# Patient Record
Sex: MALE | Race: ASIAN | NOT HISPANIC OR LATINO | URBAN - METROPOLITAN AREA
[De-identification: names, ages, dates, MRNs, and addresses within clinical notes are randomized per-mention and may not be internally consistent; named-entity substitution may affect disease eponyms.]

---

## 2017-11-22 ENCOUNTER — OFFICE VISIT (OUTPATIENT)
Dept: FAMILY MEDICINE | Facility: CLINIC | Age: 47
End: 2017-11-22

## 2017-11-22 VITALS
HEART RATE: 65 BPM | HEIGHT: 65 IN | BODY MASS INDEX: 23.36 KG/M2 | DIASTOLIC BLOOD PRESSURE: 79 MMHG | SYSTOLIC BLOOD PRESSURE: 118 MMHG | TEMPERATURE: 98.2 F | WEIGHT: 140.2 LBS

## 2017-11-22 DIAGNOSIS — G47.9 DIFFICULTY SLEEPING: ICD-10-CM

## 2017-11-22 DIAGNOSIS — R63.0 POOR APPETITE: Primary | ICD-10-CM

## 2017-11-22 DIAGNOSIS — M54.9 MUSCULOSKELETAL BACK PAIN: ICD-10-CM

## 2017-11-22 LAB
% GRANULOCYTES: 70.8 %G (ref 40–75)
ALBUMIN SERPL-MCNC: 4.6 MG/DL (ref 3.9–5.1)
ALP SERPL-CCNC: 51.8 U/L (ref 40–150)
ALT SERPL-CCNC: 26.3 U/L (ref 0–45)
AST SERPL-CCNC: 26 U/L (ref 0–55)
BILIRUB SERPL-MCNC: 0.5 MG/DL (ref 0.2–1.3)
BUN SERPL-MCNC: 15.1 MG/DL (ref 7–21)
CALCIUM SERPL-MCNC: 9.7 MG/DL (ref 8.5–10.1)
CHLORIDE SERPLBLD-SCNC: 106.4 MMOL/L (ref 98–110)
CO2 SERPL-SCNC: 29.4 MMOL/L (ref 20–32)
CREAT SERPL-MCNC: 0.8 MG/DL (ref 0.7–1.3)
GFR SERPL CREATININE-BSD FRML MDRD: >90 ML/MIN/1.7 M2
GLUCOSE SERPL-MCNC: 109.6 MG'DL (ref 70–99)
GRANULOCYTES #: 4 K/UL (ref 1.6–8.3)
HCT VFR BLD AUTO: 43.6 % (ref 40–53)
HEMOGLOBIN: 13.7 G/DL (ref 13.3–17.7)
LYMPHOCYTES # BLD AUTO: 1.2 K/UL (ref 0.8–5.3)
LYMPHOCYTES NFR BLD AUTO: 21.8 %L (ref 20–48)
MCH RBC QN AUTO: 31.4 PG (ref 26.5–35)
MCHC RBC AUTO-ENTMCNC: 31.4 G/DL (ref 32–36)
MCV RBC AUTO: 99.8 FL (ref 78–100)
MID #: 0.4 K/UL (ref 0–2.2)
MID %: 7.4 %M (ref 0–20)
PLATELET # BLD AUTO: 284 K/UL (ref 150–450)
POTASSIUM SERPL-SCNC: 4.2 MMOL/DL (ref 3.2–4.6)
PROT SERPL-MCNC: 7.1 G/DL (ref 6.8–8.8)
RBC # BLD AUTO: 4.4 M/UL (ref 4.4–5.9)
SODIUM SERPL-SCNC: 142.6 MMOL/L (ref 132–142)
TSH SERPL DL<=0.05 MIU/L-ACNC: 1.39 UIU/ML (ref 0.3–5)
WBC # BLD AUTO: 5.6 K/UL (ref 4–11)

## 2017-11-22 RX ORDER — NAPROXEN 500 MG/1
500 TABLET ORAL 2 TIMES DAILY PRN
Qty: 60 TABLET | Refills: 0 | Status: SHIPPED | OUTPATIENT
Start: 2017-11-22 | End: 2019-05-09

## 2017-11-22 ASSESSMENT — PATIENT HEALTH QUESTIONNAIRE - PHQ9: SUM OF ALL RESPONSES TO PHQ QUESTIONS 1-9: 3

## 2017-11-22 NOTE — PROGRESS NOTES
Preceptor attestation:  Patient seen and discussed with the resident. Assessment and plan reviewed with resident and agreed upon.  Supervising physician: Mary Resendiz  [facility]

## 2017-11-22 NOTE — NURSING NOTE
name: Milo May  Language: Montenegrin  Agency: [facility]  Phone number: [phone]

## 2017-11-22 NOTE — NURSING NOTE
"Injectable Influenza Immunization Documentation    1.  Has the patient received the information for the injectable influenza vaccine? YES     2. Is the patient 6 months of age or older? YES     3. Does the patient have any of the following contraindications?         Severe allergy to eggs? No     Severe allergic reaction to previous influenza vaccines? No   Severe allergy to latex? No       History of Guillain-Zeeland syndrome? No     Currently have a temperature greater than 100.4F? No        4.  Severely egg allergic patients should have flu vaccine eligibility assessed by an MD, RN, or pharmacist, and those who received flu vaccine should be observed for 15 min by an MD, RN, Pharmacist, Medical Technician, or member of clinic staff.\": YES    5. Latex-allergic patients should be given latex-free influenza vaccine Yes. Please reference the Vaccine latex table to determine if your clinic s product is latex-containing.       Vaccination given by Briana Laughlin CMA        "
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
## 2017-11-22 NOTE — PATIENT INSTRUCTIONS
Toe,     Please take Ibuprofen as needed for back pain. Return in one week to discuss lab results. Attached as some tips for good sleep hygiene. Will address more thorough at follow up.     Thank you,   Dr. Vera Emery      Caring for Your Back Throughout the Day  Take care of your back throughout the day. You will likely have fewer back problems if you do. Try to warm up before you move. Shift positions often. Also do your best to form healthy habits.    Warm up for the day  Do a few slow, catlike stretches before starting your day. This simple warmup can soften your disks, stretch your back muscles, and help prevent injuries.  Shift positions often  At work and at home, change positions often. This helps keep your body from getting stiff. Stand up or lean back while you sit. If you can, get up and move every 1/2 hour.  Form healthy habits  Here are some suggestions:     Keep a healthy weight. When you weigh too much, your back is under excess strain. But losing just a few extra pounds can help a lot.    Try not to overeat. Learn about serving sizes. The size of a serving depends on the food and the food group. Many foods list serving sizes on the labels.    Handle minor aches with cold and heat. Apply cold the first 24 to 48 hours. Use heat after that. Always place a thin cloth between your skin and the source of cold or heat.    Take medicines as directed. This helps keep pain under control. Always read labels, and call your healthcare provider or pharmacist if you have any questions.  Walk each day  A daily walk keeps your back and thigh muscles stretched and strong. This gives your back better support. Be sure to walk with your spine s three curves aligned, by keeping your head, hips, and toes connected by a vertical line.   Date Last Reviewed: 10/18/2015    3499-6900 [employer]. [address] [place] [address], [place] [address]. All rights reserved. This information is not intended as a substitute for  "professional medical care. Always follow your healthcare professional's instructions.      Tips for Sleep Hygiene  \"Sleep hygiene\" means having good sleep habits.Follow these tips to sleep better at night:     Get on a schedule. Go to bed and get up at about the same time every day.    Listen to your body. Only try to sleep when you actually feel tired or sleepy.    Be patient. If you haven't been able to get to sleep after about 30 minutes or more, get up and do something calming or boring until you feel sleepy. Then return to bed and try again.    Don't have caffeine (coffee, tea, cola drinks, chocolate and some medicines), alcohol or nicotine (cigarettes). These can make it harder for you to fall asleep and stay asleep.    Use your bed for sleeping only. That means no TV, computer or homework in bed, especially during the evening and before bedtime.    Don't nap during the day. If you must nap, make sure it is for less than 20 minutes.    Create sleep rituals that remind your body it is time to sleep. Examples include breathing exercises, stretching or reading a book.    Avoid all electronic media (smart phone, computer, tablet) within 2 hours of bed time. The \"blue light\" in these devices activates the part of the brain that keeps you awake.    Dim the lights at night.    Get early morning sources of light (walk in the sunshine) to help set sleep patterns at night.    Try a bath or shower before bed. Having a warm bath 1 to 2 hours before bedtime can help you feel sleepy. Hot baths can make you alert, so be mindful of the temperature.    Don't watch the clock. Checking the clock during the night can wake you up. It can also lead to negative thoughts such as, \"I will never fall asleep,\" which can increase anxiety and sleeplessness.    Use a sleep diary. Track your sleep schedule to know your sleep patterns and to see where you can improve.    Get regular exercise every day. Try not to do heavy exercise in the 4 " hours before bedtime.    Eat a healthy, balanced diet.    Try eating a light, healthy snack before bed, but avoid eating a heavy meal.    Create the right sleeping area. A cool, dark, quiet room is best. If needed, try earplugs, fans and blackout curtains.    Keep your daytime routine the same even if you have a bad night sleep. Avoiding activities the next day can make it harder to sleep.  For informational purposes only. Not to replace the advice of your health care provider.   Copyright   2013 [employer]. All rights reserved. [employer] 887062   01/16.

## 2017-11-22 NOTE — MR AVS SNAPSHOT
After Visit Summary   11/22/2017    Allen Donnelly    MRN: [id number]           Patient Information     Date Of Birth          1970        Visit Information        Provider Department      11/22/2017 8:40 AM [facility]        Today's Diagnoses     Poor appetite    -  1    Difficulty sleeping        Musculoskeletal back pain          Care Instructions    Toe,     Please take Ibuprofen as needed for back pain. Return in one week to discuss lab results. Attached as some tips for good sleep hygiene. Will address more thorough at follow up.     Thank you,   Dr. Vera Emery      Caring for Your Back Throughout the Day  Take care of your back throughout the day. You will likely have fewer back problems if you do. Try to warm up before you move. Shift positions often. Also do your best to form healthy habits.    Warm up for the day  Do a few slow, catlike stretches before starting your day. This simple warmup can soften your disks, stretch your back muscles, and help prevent injuries.  Shift positions often  At work and at home, change positions often. This helps keep your body from getting stiff. Stand up or lean back while you sit. If you can, get up and move every 1/2 hour.  Form healthy habits  Here are some suggestions:     Keep a healthy weight. When you weigh too much, your back is under excess strain. But losing just a few extra pounds can help a lot.    Try not to overeat. Learn about serving sizes. The size of a serving depends on the food and the food group. Many foods list serving sizes on the labels.    Handle minor aches with cold and heat. Apply cold the first 24 to 48 hours. Use heat after that. Always place a thin cloth between your skin and the source of cold or heat.    Take medicines as directed. This helps keep pain under control. Always read labels, and call your healthcare provider or pharmacist if you have any questions.  Walk each day  A daily walk keeps your  "back and thigh muscles stretched and strong. This gives your back better support. Be sure to walk with your spine s three curves aligned, by keeping your head, hips, and toes connected by a vertical line.   Date Last Reviewed: 10/18/2015    4595-6916 [employer]. [address] [place] [address], [place] [address]. All rights reserved. This information is not intended as a substitute for professional medical care. Always follow your healthcare professional's instructions.      Tips for Sleep Hygiene  \"Sleep hygiene\" means having good sleep habits.Follow these tips to sleep better at night:     Get on a schedule. Go to bed and get up at about the same time every day.    Listen to your body. Only try to sleep when you actually feel tired or sleepy.    Be patient. If you haven't been able to get to sleep after about 30 minutes or more, get up and do something calming or boring until you feel sleepy. Then return to bed and try again.    Don't have caffeine (coffee, tea, cola drinks, chocolate and some medicines), alcohol or nicotine (cigarettes). These can make it harder for you to fall asleep and stay asleep.    Use your bed for sleeping only. That means no TV, computer or homework in bed, especially during the evening and before bedtime.    Don't nap during the day. If you must nap, make sure it is for less than 20 minutes.    Create sleep rituals that remind your body it is time to sleep. Examples include breathing exercises, stretching or reading a book.    Avoid all electronic media (smart phone, computer, tablet) within 2 hours of bed time. The \"blue light\" in these devices activates the part of the brain that keeps you awake.    Dim the lights at night.    Get early morning sources of light (walk in the sunshine) to help set sleep patterns at night.    Try a bath or shower before bed. Having a warm bath 1 to 2 hours before bedtime can help you feel sleepy. Hot baths can make you alert, so be mindful of the " "temperature.    Don't watch the clock. Checking the clock during the night can wake you up. It can also lead to negative thoughts such as, \"I will never fall asleep,\" which can increase anxiety and sleeplessness.    Use a sleep diary. Track your sleep schedule to know your sleep patterns and to see where you can improve.    Get regular exercise every day. Try not to do heavy exercise in the 4 hours before bedtime.    Eat a healthy, balanced diet.    Try eating a light, healthy snack before bed, but avoid eating a heavy meal.    Create the right sleeping area. A cool, dark, quiet room is best. If needed, try earplugs, fans and blackout curtains.    Keep your daytime routine the same even if you have a bad night sleep. Avoiding activities the next day can make it harder to sleep.  For informational purposes only. Not to replace the advice of your health care provider.   Copyright   2013 [employer]. All rights reserved. [employer] [id number] - 01/16.            Follow-ups after your visit        Who to contact     Please call your clinic at [phone] to:    Ask questions about your health    Make or cancel appointments    Discuss your medicines    Learn about your test results    Speak to your doctor   If you have compliments or concerns about an experience at your clinic, or if you wish to file a complaint, please contact [facility] Patient Relations at [phone] or email us at [email or web address]         Additional Information About Your Visit        2[employer] Information     U-NOTE is an electronic gateway that provides easy, online access to your medical records. With U-NOTE, you can request a clinic appointment, read your test results, renew a prescription or communicate with your care team.     To sign up for [employer] visit the website at [email or web address]   You will be asked to enter the access code listed below, as well as some personal " "information. Please follow the directions to create your username and password.     Your access code is: [email or web address]  Expires: 2018  9:25 AM     Your access code will  in 90 days. If you need help or a new code, please contact your [facility] or call [phone] for assistance.        Care EveryWhere ID     This is your Care EveryWhere ID. This could be used by other organizations to access your [place] medical records  [id number]        Your Vitals Were     Pulse Temperature Height BMI (Body Mass Index)          65 98.2  F (36.8  C) (Oral) 5' 4.5\" (163.8 cm) 23.69 kg/m2         Blood Pressure from Last 3 Encounters:   17 118/79   16 121/80   09/21/15 109/79    Weight from Last 3 Encounters:   17 140 lb 3.2 oz (63.6 kg)   16 139 lb 8 oz (63.3 kg)   09/21/15 125 lb (56.7 kg)              We Performed the Following     CBC with Diff Plt ([facility])     Comprehensive Metabolic Panel ([place])     TSH  Sensitive ([facility])     Vitamin D 25-Hydroxy ([facility])          Today's Medication Changes          These changes are accurate as of: 17  9:25 AM.  If you have any questions, ask your nurse or doctor.               Start taking these medicines.        Dose/Directions    naproxen 500 MG tablet   Commonly known as:  NAPROSYN   Used for:  Musculoskeletal back pain   Started by:  Vera Emery MD        Dose:  500 mg   Take 1 tablet (500 mg) by mouth 2 times daily as needed for moderate pain   Quantity:  60 tablet   Refills:  0            Where to get your medicines      These medications were sent to [facility] - [place], [place] - [address], [place] [address]     Phone:  [phone]     naproxen 500 MG tablet                Primary Care Provider Office Phone # Fax #    Vera Emery [phone]       [facility]  [address] [place] [address]        Equal Access to Services "     VLADIMIR LING : Hadii aad ku ashlie Hernandez, waaxda luqadaha, qaybta kaalmada vernamayda, yoni chaka haypal chubolivaremeka espinal . So [facility] [phone].    ATENCIÓN: Si habla español, tiene a oates disposición servicios gratuitos de asistencia lingüística. Llame al [phone].    We comply with applicable federal civil rights laws and [place] laws. We do not discriminate on the basis of race, color, national origin, age, disability, sex, sexual orientation, or gender identity.            Thank you!     Thank you for choosing [facility]  for your care. Our goal is always to provide you with excellent care. Hearing back from our patients is one way we can continue to improve our services. Please take a few minutes to complete the written survey that you may receive in the mail after your visit with us. Thank you!             Your Updated Medication List - Protect others around you: Learn how to safely use, store and throw away your medicines at www.disposemymeds.org.          This list is accurate as of: 11/22/17  9:25 AM.  Always use your most recent med list.                   Brand Name Dispense Instructions for use Diagnosis    naproxen 500 MG tablet    NAPROSYN    60 tablet    Take 1 tablet (500 mg) by mouth 2 times daily as needed for moderate pain    Musculoskeletal back pain       TYLENOL PO

## 2017-11-22 NOTE — PROGRESS NOTES
"[facility]         SUNNY Donnelly is a 47 year old male with a PMH of *** presenting to clinic today with a chief complaint of poor appetite, trouble sleeping, and body aches. Patient has had a poor appetite and trouble sleeping over the last month. He has been experiencing body aches over the last year. He describes his body aches as pains in his sides bilaterally. He sometime has a headache associated with throbbing behind his head.****     PMH, Medications and Allergies were reviewed and updated as needed.    ROS:  General: No fevers, chills  Head: Positive for headache  Ears: No acute change in hearing.    CV: No chest pain or palpitations.  Resp: No shortness of breath.  No cough. No hemoptysis.  GI: No nausea, vomiting, constipation, diarrhea  : No urinary pains    Patient Active Problem List   Diagnosis     Scalp mass       Current Outpatient Prescriptions   Medication Sig Dispense Refill     Acetaminophen (TYLENOL PO)               OBJECTIVE:       Vitals:   Vitals:    11/22/17 0848   BP: 118/79   BP Location: Left arm   Patient Position: Sitting   Cuff Size: Adult Regular   Pulse: 65   Temp: 98.2  F (36.8  C)   TempSrc: Oral   Weight: 140 lb 3.2 oz (63.6 kg)   Height: 5' 4.5\" (163.8 cm)     BMI: Body mass index is 23.69 kg/(m^2).    Gen:  Well nourished and in no acute distress  HEENT: PERRL; TMs normal color and landmarks; nasopharynx pink and moist; oropharynx pink and moist  Neck: supple without lymphadenopathy  CV:  RRR  - no murmurs noted   Pulm:  CTAB, no wheezes or crackles noted, good air entry   ABD: soft, nontender, no masses, no rebound, BS intact throughout, no hepatosplenomegaly  Extrem: no cyanosis, edema or clubbing  Psych: Euthymic           ASSESSMENT and PLAN:     There are no diagnoses linked to this encounter.    Return to clinic in *** for follow up of *** or sooner if develops new or worsening symptoms.    Discussed with Dr." {BTPRECEPTORS:[id number]}    Vera Monroy, DO PGY 1  [facility]  [phone]

## 2017-11-24 LAB — 25(OH)D3 SERPL-MCNC: 23.7 NG/ML (ref 30–80)

## 2017-11-27 ENCOUNTER — OFFICE VISIT (OUTPATIENT)
Dept: FAMILY MEDICINE | Facility: CLINIC | Age: 47
End: 2017-11-27

## 2017-11-27 VITALS
BODY MASS INDEX: 23.53 KG/M2 | HEIGHT: 65 IN | WEIGHT: 141.2 LBS | DIASTOLIC BLOOD PRESSURE: 82 MMHG | OXYGEN SATURATION: 99 % | TEMPERATURE: 98.1 F | HEART RATE: 68 BPM | SYSTOLIC BLOOD PRESSURE: 124 MMHG

## 2017-11-27 DIAGNOSIS — M54.9 MUSCULOSKELETAL BACK PAIN: Primary | ICD-10-CM

## 2017-11-27 DIAGNOSIS — R79.89 LOW VITAMIN D LEVEL: ICD-10-CM

## 2017-11-27 DIAGNOSIS — Z23 INFLUENZA VACCINE NEEDED: ICD-10-CM

## 2017-11-27 NOTE — MR AVS SNAPSHOT
After Visit Summary   2017    Allen Donnelly    MRN: [id number]           Patient Information     Date Of Birth          1970        Visit Information        Provider Department      2017 8:40 AM Vera Emery MD [facility]        Care Instructions    Toe,     Please take vitamin D daily. Continue to use naproxen at night as needed. Return for any recurring symptoms.     Thank you,    Vera Emery          Follow-ups after your visit        Who to contact     Please call your clinic at [phone] to:    Ask questions about your health    Make or cancel appointments    Discuss your medicines    Learn about your test results    Speak to your doctor   If you have compliments or concerns about an experience at your clinic, or if you wish to file a complaint, please contact [facility] Patient Relations at [phone] or email us at [email or web address]         Additional Information About Your Visit        MyChart Information     App[employer]t is an electronic gateway that provides easy, online access to your medical records. With [employer], you can request a clinic appointment, read your test results, renew a prescription or communicate with your care team.     To sign up for [employer] visit the website at [email or web address]   You will be asked to enter the access code listed below, as well as some personal information. Please follow the directions to create your username and password.     Your access code is: [email or web address]  Expires: 2018  9:25 AM     Your access code will  in 90 days. If you need help or a new code, please contact your [facility] or call [phone] for assistance.        Care EveryWhere ID     This is your Care EveryWhere ID. This could be used by other organizations to access your [place] medical records  [id number]        Your Vitals Were     Pulse Temperature Height Pulse Oximetry  "BMI (Body Mass Index)       68 98.1  F (36.7  C) (Oral) 5' 5\" (165.1 cm) 99% 23.5 kg/m2        Blood Pressure from Last 3 Encounters:   11/27/17 124/82   11/22/17 118/79   11/11/16 121/80    Weight from Last 3 Encounters:   11/27/17 141 lb 3.2 oz (64 kg)   11/22/17 140 lb 3.2 oz (63.6 kg)   11/11/16 139 lb 8 oz (63.3 kg)              Today, you had the following     No orders found for display       Primary Care Provider Office Phone # Fax #    Vera Emery [phone]       [facility]  [address] [place] [address]        Equal Access to Services     VLADIMIR LING : Lisa dailey Sodioni, waaxda luqadaha, qaybta kaalmada adeegyada, yoni espinal . So [facility] [phone].    ATENCIÓN: Si habla español, tiene a oates disposición servicios gratuitos de asistencia lingüística. Llame al [phone].    We comply with applicable federal civil rights laws and [place] laws. We do not discriminate on the basis of race, color, national origin, age, disability, sex, sexual orientation, or gender identity.            Thank you!     Thank you for choosing [facility]  for your care. Our goal is always to provide you with excellent care. Hearing back from our patients is one way we can continue to improve our services. Please take a few minutes to complete the written survey that you may receive in the mail after your visit with us. Thank you!             Your Updated Medication List - Protect others around you: Learn how to safely use, store and throw away your medicines at www.disposemymeds.org.          This list is accurate as of: 11/27/17  8:55 AM.  Always use your most recent med list.                   Brand Name Dispense Instructions for use Diagnosis    cholecalciferol 1000 UNIT tablet    vitamin D3    100 tablet    Take 1 tablet (1,000 Units) by mouth daily    Poor appetite, Difficulty sleeping, Musculoskeletal back pain       naproxen 500 MG tablet "    NAPROSYN    60 tablet    Take 1 tablet (500 mg) by mouth 2 times daily as needed for moderate pain    Musculoskeletal back pain       TYLENOL PO

## 2017-11-27 NOTE — PROGRESS NOTES
Preceptor attestation:  Patient seen and discussed with the resident. Assessment and plan reviewed with resident and agreed upon.  Supervising physician: Fili Daniel  [facility]

## 2017-11-27 NOTE — PROGRESS NOTES
"[facility]         SUNNY Donnelly is a 47 year old male refugee from [facility] presenting to clinic today for lab follow up. Patient was seen and evaluated in clinic one week ago on 11/22/17 for poor appetite, difficulty sleeping and body aches. Labs were drawn at that time including vitamin D, CMP, TSH, and CBC. Patient was given naproxen for musculoskeletal back pain and given instructions to return in one week for lab follow up. During evaluation today the patient notes he is feeling significantly improved. He has been taking one naproxen at night which is helping his back pain and allowing him to sleep. He denies any new symptoms and his appetite has improved. We discussed his labs in detail. Labs showed him to have low vitamin D levels however the remainder of his labs were essentially within normal limits.     PMH, Medications and Allergies were reviewed and updated as needed.    ROS:  General: No fevers, chills  Head: No headache  CV: No chest pain or palpitations.  Resp: No shortness of breath.  No cough. No hemoptysis.  : No urinary pains    Patient Active Problem List   Diagnosis     Scalp mass     Poor appetite     Difficulty sleeping     Musculoskeletal back pain     Low vitamin D level       Current Outpatient Prescriptions   Medication Sig Dispense Refill     naproxen (NAPROSYN) 500 MG tablet Take 1 tablet (500 mg) by mouth 2 times daily as needed for moderate pain 60 tablet 0     cholecalciferol (VITAMIN D3) 1000 UNIT tablet Take 1 tablet (1,000 Units) by mouth daily (Patient not taking: Reported on 11/27/2017) 100 tablet 3     Acetaminophen (TYLENOL PO)               OBJECTIVE:       Vitals:   Vitals:    11/27/17 0844   BP: 124/82   Pulse: 68   Temp: 98.1  F (36.7  C)   TempSrc: Oral   SpO2: 99%   Weight: 141 lb 3.2 oz (64 kg)   Height: 5' 5\" (165.1 cm)     BMI: Body mass index is 23.5 kg/(m^2).    Gen:  Well nourished and in no acute distress  HEENT: PERRL; TMs normal " color and landmarks; nasopharynx pink and moist; oropharynx pink and moist  Neck: supple without lymphadenopathy  CV:  RRR  - no murmurs noted   MSK:  Mild right sided paraspinal muscle tenderness to palpation. Improved from previous visit. No point tenderness over spinous processes.    Pulm:  CTAB, no wheezes or crackles noted, good air entry   ABD: soft, nontender, no masses, no rebound, BS intact throughout, no hepatosplenomegaly  Extrem: no cyanosis, edema or clubbing  Psych: Euthymic           ASSESSMENT and PLAN:   1. Musculoskeletal back pain  - Continue to take naproxen PRN for back pain  - Continue home stretching exercises for back    2. Low vitamin D level  - Cholecalciferol (Vitamin D3) 1000 unit tablet- Take 1 tablet daily by mouth with breakfast    Return to clinic for routine follow up or if he develops new or worsening symptoms.     Discussed with MD Vera Finnegan, DO PGY 1  [facility]  [phone]

## 2017-11-27 NOTE — NURSING NOTE
name: Milo May  Language: Guinean  Agency: [facility]  Phone number: [phone]

## 2017-11-27 NOTE — PROGRESS NOTES
"[facility]         SUNNY       Toe Andrzej is a 47 year old male with a PMH of *** presenting to clinic today with a chief complaint of ***.    PMH, Medications and Allergies were reviewed and updated as needed.    ROS:  General: No fevers, chills  Head: No headache  Ears: No acute change in hearing.    CV: No chest pain or palpitations.  Resp: No shortness of breath.  No cough. No hemoptysis.  GI: No nausea, vomiting, constipation, diarrhea  : No urinary pains    Patient Active Problem List   Diagnosis     Scalp mass     Poor appetite     Difficulty sleeping     Musculoskeletal back pain       Current Outpatient Prescriptions   Medication Sig Dispense Refill     naproxen (NAPROSYN) 500 MG tablet Take 1 tablet (500 mg) by mouth 2 times daily as needed for moderate pain 60 tablet 0     cholecalciferol (VITAMIN D3) 1000 UNIT tablet Take 1 tablet (1,000 Units) by mouth daily (Patient not taking: Reported on 11/27/2017) 100 tablet 3     Acetaminophen (TYLENOL PO)               OBJECTIVE:       Vitals:   Vitals:    11/27/17 0844   BP: 124/82   Pulse: 68   Temp: 98.1  F (36.7  C)   TempSrc: Oral   SpO2: 99%   Weight: 141 lb 3.2 oz (64 kg)   Height: 5' 5\" (165.1 cm)     BMI: Body mass index is 23.5 kg/(m^2).    Gen:  Well nourished and in no acute distress  HEENT: PERRL; TMs normal color and landmarks; nasopharynx pink and moist; oropharynx pink and moist  Neck: supple without lymphadenopathy  CV:  RRR  - no murmurs noted   Pulm:  CTAB, no wheezes or crackles noted, good air entry   ABD: soft, nontender, no masses, no rebound, BS intact throughout, no hepatosplenomegaly  Extrem: no cyanosis, edema or clubbing  Psych: Euthymic           ASSESSMENT and PLAN:     There are no diagnoses linked to this encounter.    Return to clinic in *** for follow up of *** or sooner if develops new or worsening symptoms.    Discussed with  {BTPRECEPTORS:[id number]}    Vera Monroy,  PGY 1  [facility] " [facility]  [phone]

## 2017-11-27 NOTE — PATIENT INSTRUCTIONS
Toe,     Please take vitamin D daily. Continue to use naproxen at night as needed. Return for any recurring symptoms.     Thank you,    Vera Emery

## 2019-05-09 ENCOUNTER — OFFICE VISIT (OUTPATIENT)
Dept: FAMILY MEDICINE | Facility: CLINIC | Age: 49
End: 2019-05-09
Payer: COMMERCIAL

## 2019-05-09 VITALS
OXYGEN SATURATION: 100 % | TEMPERATURE: 98.1 F | HEART RATE: 77 BPM | DIASTOLIC BLOOD PRESSURE: 77 MMHG | WEIGHT: 143.8 LBS | RESPIRATION RATE: 16 BRPM | HEIGHT: 65 IN | SYSTOLIC BLOOD PRESSURE: 125 MMHG | BODY MASS INDEX: 23.96 KG/M2

## 2019-05-09 DIAGNOSIS — Z00.00 ROUTINE GENERAL MEDICAL EXAMINATION AT A HEALTH CARE FACILITY: ICD-10-CM

## 2019-05-09 DIAGNOSIS — Z00.00 PREVENTATIVE HEALTH CARE: Primary | ICD-10-CM

## 2019-05-09 DIAGNOSIS — N52.9 ERECTILE DYSFUNCTION, UNSPECIFIED ERECTILE DYSFUNCTION TYPE: ICD-10-CM

## 2019-05-09 LAB
CHOLEST SERPL-MCNC: 162 MG/DL
FASTING?: ABNORMAL
HDLC SERPL-MCNC: 42 MG/DL
HIV 1+2 AB+HIV1 P24 AG SERPL QL IA: NEGATIVE
LDLC SERPL CALC-MCNC: 71 MG/DL
TRIGL SERPL-MCNC: 246 MG/DL

## 2019-05-09 SDOH — HEALTH STABILITY: MENTAL HEALTH: HOW MANY STANDARD DRINKS CONTAINING ALCOHOL DO YOU HAVE ON A TYPICAL DAY?: 1 OR 2

## 2019-05-09 SDOH — HEALTH STABILITY: MENTAL HEALTH: HOW OFTEN DO YOU HAVE A DRINK CONTAINING ALCOHOL?: 4 OR MORE TIMES A WEEK

## 2019-05-09 SDOH — HEALTH STABILITY: MENTAL HEALTH: HOW OFTEN DO YOU HAVE 6 OR MORE DRINKS ON ONE OCCASION?: NEVER

## 2019-05-09 ASSESSMENT — MIFFLIN-ST. JEOR: SCORE: 1445.4

## 2019-05-09 NOTE — PROGRESS NOTES
Preceptor Attestation:   Patient seen, evaluated and discussed with the resident. I have verified the content of the note, which accurately reflects my assessment of the patient and the plan of care.   Supervising Physician:  Blas Lyles MD

## 2019-05-09 NOTE — PROGRESS NOTES
"  Male Physical Note      Concerns today: Patient has injected an unknown substance into the skin of his penis with a needle. Possibly olive oil, but patient is unclear.  A \"friend\" had done this about 1 year ago. Patient denies any dysuria. He is still able to have an erection and ejaculate. His wife notes that his erection is not as strong as it had been in the past.     A [employer]  was used for  this visit.     ROS:                      CONSTITUTIONAL: no fatigue, no unexpected change in weight  SKIN: no worrisome rashes, no worrisome moles, no worrisome lesions  EYES: no acute vision problems or changes  ENT: no ear problems, no mouth problems, no throat problems  RESP: no significant cough, no shortness of breath  CV: no chest pain, no palpitations, no new or worsening peripheral edema  GI: no nausea, no vomiting, no constipation, no diarrhea  : no frequency, no dysuria, no hematuria  MS: no claudication, no myalgias, no joint aches  NEURO: no weakness, no dizziness, no syncope, no headaches  PSYCHIATRIC: NEGATIVE for changes in mood or affect    History reviewed. No pertinent past medical history.     Family History   Problem Relation Age of Onset     Diabetes No family hx of      Coronary Artery Disease No family hx of      Breast Cancer No family hx of      Colon Cancer No family hx of      Prostate Cancer No family hx of      Other Cancer No family hx of      Reviewed no other significant FH           Family History and past Medical History reviewed and unchanged/updated.    Social History     Tobacco Use     Smoking status: Former Smoker     Types: Dip, chew, snus or snuff     Smokeless tobacco: Never Used     Tobacco comment: chews tobacco    Substance Use Topics     Alcohol use: Yes     Alcohol/week: 0.0 oz       Children ? yes 3 children, aged 26, 22, and 11.     Has anyone hurt you physically, for example by pushing, hitting, slapping or kicking you or forcing you to have sex? " "Denies  Do you feel threatened or controlled by a partner, ex-partner or anyone in your life? Denies    RISK BEHAVIORS AND HEALTHY HABITS:  Tobacco Use/Smoking: Details: Chewing tobacco  Illicit Drug Use: None  ETOH: Details drinks 1 beer a day  Do you use alcohol? Yes  Number of drinks per day 1  Number of drinking days a week 7  Sexually Active: Yes  Diet (5-7 servings of fruits/veg daily): Yes   Exercise (30 min accumulated most days):Yes  Dental Care: No   Calcium 1500 mg/d:  No  Seat Belt Use: Yes     Cholesterol Level (>44 yo or at risk):  Recommended and patient accepted testing. and HIV screening:  Recommended and patient accepted testing.  Prostate screening discussed and patient informed that risks of screening may exceed benefits.        Immunization History   Administered Date(s) Administered     HepB 11/27/2008     Influenza (IIV3) PF 10/10/2012, 11/22/2017     Influenza Vaccine, 3 YRS +, IM (QUADRIVALENT W/PRESERVATIVES) 11/10/2015, 11/11/2016     MMR 11/27/2008, 01/21/2010     TD (ADULT, 7+) 11/27/2008, 01/21/2010     Tdap (Adacel,Boostrix) 05/11/2009     Reviewed Immunization Record Today    EXAMINATION:  /77   Pulse 77   Temp 98.1  F (36.7  C) (Oral)   Resp 16   Ht 1.645 m (5' 4.76\")   Wt 65.2 kg (143 lb 12.8 oz)   SpO2 100%   BMI 24.10 kg/m    GENERAL: healthy, alert and no distress  EYES: Eyes grossly normal to inspection, extraocular movements - intact, and PERRL  HENT: ear canals- normal; TMs- normal; Nose- normal; Mouth- no ulcers, no lesions  NECK: no tenderness, no adenopathy, no asymmetry, no masses, no stiffness; thyroid- normal to palpation  RESP: lungs clear to auscultation - no rales, no rhonchi, no wheezes  CV: regular rates and rhythm, normal S1 S2, no S3 or S4 and no murmur, no click or rub -  ABDOMEN: soft, no tenderness, no  hepatosplenomegaly, no masses, normal bowel sounds  MS: extremities- no gross deformities noted, no edema  SKIN: no suspicious lesions, no " rashes  NEURO: strength and tone- normal, sensory exam- grossly normal, mentation- intact, speech- normal, reflexes- symmetric  BACK: no CVA tenderness, no paralumbar tenderness  - male: circumferential thickening of the penile skin 1.5 cm distal to the base of the the penis. testicles- normal, no atrophy, no masses;  no inguinal hernias  RECTAL- male: no masses, no hemorhoids, Prostate- symmetric, no  nodularity, no masses, no hypertrophy  PSYCH: Alert and oriented times 3; speech- coherent , normal rate and volume; able to articulate logical thoughts, able to abstract reason, no tangential thoughts, no hallucinations or delusions, affect- normal  LYMPHATICS: ant. cervical- normal, post. cervical- normal, axillary- normal, supraclavicular- normal, inguinal- normal    ASSESSMENT:  1. Health Care Maintenance: Normal Physical Exam  2. Erectile dysfunction  3. Thickened penile skin s/p injection w/ foreign substance   -This did happen one year ago, however unclear what was injected into the penis. Skin thickening does appear abnormal. Will have urology evaluate this.     PLAN:  1.Lipid panel ordered.   2. Check HIV, HEP C, Gonorrhea/Chlamydia  3. Referral to Urology    Cody Rodrigez MD PGY3  [facility]

## 2019-05-09 NOTE — PATIENT INSTRUCTIONS
Preventive Health Recommendations  Male Ages 40 to 49    Yearly exam:             See your health care provider every year in order to  o   Review health changes.   o   Discuss preventive care.    o   Review your medicines if your doctor has prescribed any.    You should be tested each year for STDs (sexually transmitted diseases) if you re at risk.     Have a cholesterol test every 5 years.     Have a colonoscopy (test for colon cancer) if someone in your family has had colon cancer or polyps before age 50.     After age 45, have a diabetes test (fasting glucose). If you are at risk for diabetes, you should have this test every 3 years.      Talk with your health care provider about whether or not a prostate cancer screening test (PSA) is right for you.    Shots: Get a flu shot each year. Get a tetanus shot every 10 years.     Nutrition:    Eat at least 5 servings of fruits and vegetables daily.     Eat whole-grain bread, whole-wheat pasta and brown rice instead of white grains and rice.     Get adequate Calcium and Vitamin D.     Lifestyle    Exercise for at least 150 minutes a week (30 minutes a day, 5 days a week). This will help you control your weight and prevent disease.     Limit alcohol to one drink per day.     No smoking.     Wear sunscreen to prevent skin cancer.     See your dentist every six months for an exam and cleaning.     [facility] [address] [place] [address]  Phone: [phone]  Fax: [phone]    Appointment:  Wednesday May 29, 2019  Arrival Time:  12:45 pm  Provider:  Dr. Cabrera      ** has requested a **  for your appointment    Please bring a copy of your insurance card, photo ID, your medications including over the counter medications or a list of them with you to your appointment    If you cannot make this appointment please call [phone] to reschedule    **HOLD FOR COMPLETED OV NOTE Referral, demographics, office visit, labs  and medication list faxed to [phone]. Sue BELTRAN

## 2019-05-09 NOTE — NURSING NOTE
Due to patient being non-English speaking/uses sign language, an  was used for this visit. Only for face-to-face interpretation by an external agency, date and length of interpretation can be found on the scanned worksheet.       name: Milo May  Language: Lao  Agency:  [employer]  Telephone number: [phone]  Type of interpretation:  Face-to-face, spoken

## 2019-05-10 LAB
C TRACH RRNA SPEC QL NAA+PROBE: NEGATIVE
HCV AB SER QL: NEGATIVE
N GONORRHOEA RRNA SPEC QL NAA+PROBE: NEGATIVE
TREPONEMA ANTIBODY (SYPHILIS): NEGATIVE

## 2019-05-17 NOTE — RESULT ENCOUNTER NOTE
Please have  call patient with the following:    Gio Donnelly,    The results of your testing have come back. You are negative for any sexually transmitted diseases including chlamydia, gonorrhea, hepatitis C, HIV, and syphilis. Your cholesterol level is also normal at this time. You should still follow-up with urology as we had previously discussed. If you have any questions, feel free to call back or schedule a follow-up appointment.    Thank you,    Cody Rodrigez MD PGY3  [facility]

## 2021-10-08 ENCOUNTER — OFFICE VISIT (OUTPATIENT)
Dept: FAMILY MEDICINE | Facility: CLINIC | Age: 51
End: 2021-10-08
Payer: COMMERCIAL

## 2021-10-08 VITALS
WEIGHT: 142.2 LBS | TEMPERATURE: 98.4 F | BODY MASS INDEX: 23.69 KG/M2 | DIASTOLIC BLOOD PRESSURE: 83 MMHG | SYSTOLIC BLOOD PRESSURE: 122 MMHG | HEIGHT: 65 IN | RESPIRATION RATE: 18 BRPM | HEART RATE: 67 BPM | OXYGEN SATURATION: 99 %

## 2021-10-08 DIAGNOSIS — Z00.00 PREVENTATIVE HEALTH CARE: Primary | ICD-10-CM

## 2021-10-08 DIAGNOSIS — N48.89 SWELLING OF PENIS: ICD-10-CM

## 2021-10-08 DIAGNOSIS — Z12.11 SCREENING FOR COLON CANCER: ICD-10-CM

## 2021-10-08 DIAGNOSIS — Z00.00 ROUTINE GENERAL MEDICAL EXAMINATION AT A HEALTH CARE FACILITY: ICD-10-CM

## 2021-10-08 DIAGNOSIS — Z23 NEED FOR PROPHYLACTIC VACCINATION AND INOCULATION AGAINST INFLUENZA: ICD-10-CM

## 2021-10-08 LAB — PSA SERPL-MCNC: 0.56 UG/L (ref 0–3.5)

## 2021-10-08 PROCEDURE — 90715 TDAP VACCINE 7 YRS/> IM: CPT

## 2021-10-08 PROCEDURE — 36415 COLL VENOUS BLD VENIPUNCTURE: CPT

## 2021-10-08 PROCEDURE — 90471 IMMUNIZATION ADMIN: CPT

## 2021-10-08 PROCEDURE — 90686 IIV4 VACC NO PRSV 0.5 ML IM: CPT

## 2021-10-08 PROCEDURE — G0103 PSA SCREENING: HCPCS

## 2021-10-08 PROCEDURE — 90472 IMMUNIZATION ADMIN EACH ADD: CPT

## 2021-10-08 PROCEDURE — 99396 PREV VISIT EST AGE 40-64: CPT | Mod: 25

## 2021-10-08 ASSESSMENT — MIFFLIN-ST. JEOR: SCORE: 1426.89

## 2021-10-08 NOTE — NURSING NOTE
Due to patient being non-English speaking/uses sign language, an  was used for this visit. Only for face-to-face interpretation by an external agency, date and length of interpretation can be found on the scanned worksheet.     name: Milo May  Agency: [employer]  Language: Katharine   Telephone number: [phone]  Type of interpretation: Face-to-face, spoken

## 2021-10-08 NOTE — PROGRESS NOTES
Male Physical Note      Concerns today: Erectile dysfunction    A [employer]  was used for  this visit.     ROS:                      CONSTITUTIONAL: no fatigue, no unexpected change in weight  SKIN: Lip lesion present for 2 days. Raw, red base.  EYES: no acute vision problems or changes  ENT: no ear problems, no mouth problems, no throat problems  RESP: no significant cough, no shortness of breath  CV: no chest pain, no palpitations, no new or worsening peripheral edema  GI: no nausea, no vomiting, no constipation, no diarrhea  : no frequency, no dysuria, no hematuria   male : Injected substance into penis to help with penis enlargement.   MS: no claudication, no myalgias, no joint aches  NEURO: no weakness, no dizziness, no syncope, no headaches  PSYCHIATRIC: NEGATIVE for changes in mood or affect    History reviewed. No pertinent past medical history.     Family History   Problem Relation Age of Onset     Diabetes No family hx of      Coronary Artery Disease No family hx of      Breast Cancer No family hx of      Colon Cancer No family hx of      Prostate Cancer No family hx of      Other Cancer No family hx of      Reviewed no other significant FH           Family History and past Medical History reviewed and unchanged/updated.    Social History     Tobacco Use     Smoking status: Former Smoker     Types: Dip, chew, snus or snuff     Smokeless tobacco: Current User     Types: Chew     Tobacco comment: chews tobacco    Substance Use Topics     Alcohol use: Yes     Alcohol/week: 0.0 standard drinks       Children: 3     Has anyone hurt you physically, for example by pushing, hitting, slapping or kicking you or forcing you to have sex? Denies  Do you feel threatened or controlled by a partner, ex-partner or anyone in your life? Denies    RISK BEHAVIORS AND HEALTHY HABITS:  Tobacco Use/Smoking: Details Chews tobacco  Illicit Drug Use: None  ETOH: None  Do you use alcohol? No  Sexually Active:  "Yes  Diet (5-7 servings of fruits/veg daily): Yes   Exercise (30 min accumulated most days):Yes  Dental Care: Yes   Calcium 1500 mg/d:  No  Seat Belt Use: Yes     Cholesterol Level (>46 yo or at risk):  Lipid and ASA use (>3% risk in 5 y):  Date done 10/8/2021  Result(s) pending  Colon CA Screening (>50-75 ):  Recommended and patient accepted FIT testing  CV Risk based on Pooled Cohort Risk:3.2%     Immunization History   Administered Date(s) Administered     HepB 11/27/2008     Influenza (IIV3) PF 10/10/2012, 11/22/2017     Influenza Vaccine IM > 6 months Valent IIV4 (Alfuria,Fluzone) 10/08/2021     Influenza Vaccine, 6+MO IM (QUADRIVALENT W/PRESERVATIVES) 11/10/2015, 11/11/2016     MMR 11/27/2008, 01/21/2010     TD (ADULT, 7+) 11/27/2008, 01/21/2010     Tdap (Adacel,Boostrix) 05/11/2009, 10/08/2021    Reviewed Immunization Record Today    EXAMINATION:  /83   Pulse 67   Temp 98.4  F (36.9  C)   Resp 18   Ht 1.651 m (5' 5\")   Wt 64.5 kg (142 lb 3.2 oz)   SpO2 99%   BMI 23.66 kg/m    GENERAL: healthy, alert and no distress  EYES: Eyes grossly normal to inspection, extraocular movements - intact, and PERRL  HENT: ear canals- normal; TMs- normal; Nose- normal; Mouth- no ulcers, no lesions  NECK: no tenderness, no adenopathy, no asymmetry, no masses, no stiffness; thyroid- normal to palpation  RESP: lungs clear to auscultation - no rales, no rhonchi, no wheezes  CV: regular rates and rhythm, normal S1 S2, no S3 or S4 and no murmur, no click or rub -  ABDOMEN: soft, no tenderness, no  hepatosplenomegaly, no masses, normal bowel sounds  MS: extremities- no gross deformities noted, no edema  SKIN: Skin lesion suspicious for herpes because it is recurrent but it is not painful.  NEURO: strength and tone- normal, sensory exam- grossly normal, mentation- intact, speech- normal, reflexes- symmetric  - male: testicles normal without atrophy or masses and Swelling of the foreskin possibly due to the patient " injecting a substance 2 years ago.  RECTAL- male: no masses, no hemorhoids, Prostate- symmetric, no  nodularity, no masses, no hypertrophy  PSYCH: Alert and oriented times 3; speech- coherent , normal rate and volume; able to articulate logical thoughts, able to abstract reason, no tangential thoughts, no hallucinations or delusions, affect- normal  LYMPHATICS: ant. cervical- normal, post. cervical- normal, axillary- normal, supraclavicular- normal, inguinal- normal    ASSESSMENT:  1. Health Care Maintenance: Normal Physical Exam. Patient does appear to have some swelling of the foreskin.  This is likely from his self-inflicted injection related himself 2 years ago.  He said he was given a referral to urology but never received a phone call.  This does not seem infected because there is no erythema or pain and he has had this for 2 years.  He needs routine screening for colon cancer and prostate cancer.  He has had lipid panel drawn within the last 2 years.  He needs his annual flu vaccine and his Tdap to be in full compliance.    PLAN:  1.Patient agreed to FOBT screening.  2. PSA screen  3. Referrals to urology   4. Routine follow up in one year.  5. TDAP and Influenza vaccine

## 2021-10-08 NOTE — PROGRESS NOTES
"Preceptor attestation:  Vital signs reviewed: /83   Pulse 67   Temp 98.4  F (36.9  C)   Resp 18   Ht 1.651 m (5' 5\")   Wt 64.5 kg (142 lb 3.2 oz)   SpO2 99%   BMI 23.66 kg/m      Patient seen, evaluated, and discussed with the resident.  I have verified the content of the note, which accurately reflects my assessment of the patient and the plan of care.    Supervising physician: Peggy Banerjee MD  [facility]  "

## 2021-10-11 ENCOUNTER — TELEPHONE (OUTPATIENT)
Dept: UROLOGY | Facility: CLINIC | Age: 51
End: 2021-10-11

## 2021-10-11 NOTE — TELEPHONE ENCOUNTER
[facility] Call Center    Phone Message    May a detailed message be left on voicemail: yes     Reason for Call: Appointment Intake    Referring Provider Name: Peggy Banerjee MD  Diagnosis and/or Symptoms: Swelling of penis Other Comments: Foreskin swelling after injecting unknown substance for penis elargment (non-medical)    This diagnosis is not listed on the guidelines, please follow up with pt    Action Taken: Message routed to:  [facility] ([facility] Urology    Travel Screening: Not Applicable

## 2021-10-12 ENCOUNTER — LAB (OUTPATIENT)
Dept: LAB | Facility: CLINIC | Age: 51
End: 2021-10-12
Payer: COMMERCIAL

## 2021-10-12 DIAGNOSIS — Z00.00 PREVENTATIVE HEALTH CARE: ICD-10-CM

## 2021-10-12 PROCEDURE — 82274 ASSAY TEST FOR BLOOD FECAL: CPT

## 2021-10-12 NOTE — TELEPHONE ENCOUNTER
Tried calling to get patient scheduled with Dr Vivas in person. Number is not in service. If patient calls back please get updated number.

## 2021-10-14 LAB — HEMOCCULT STL QL IA: NEGATIVE

## 2022-05-23 ENCOUNTER — OFFICE VISIT (OUTPATIENT)
Dept: FAMILY MEDICINE | Facility: CLINIC | Age: 52
End: 2022-05-23
Payer: COMMERCIAL

## 2022-05-23 VITALS
HEIGHT: 65 IN | DIASTOLIC BLOOD PRESSURE: 75 MMHG | HEART RATE: 73 BPM | WEIGHT: 142.4 LBS | SYSTOLIC BLOOD PRESSURE: 114 MMHG | BODY MASS INDEX: 23.72 KG/M2 | OXYGEN SATURATION: 99 % | TEMPERATURE: 98.1 F | RESPIRATION RATE: 16 BRPM

## 2022-05-23 DIAGNOSIS — Z00.00 ROUTINE GENERAL MEDICAL EXAMINATION AT A HEALTH CARE FACILITY: Primary | ICD-10-CM

## 2022-05-23 DIAGNOSIS — N48.89 SWELLING OF PENIS: ICD-10-CM

## 2022-05-23 LAB
CHOLEST SERPL-MCNC: 200 MG/DL
FASTING STATUS PATIENT QL REPORTED: ABNORMAL
HDLC SERPL-MCNC: 51 MG/DL
LDLC SERPL CALC-MCNC: 99 MG/DL
TRIGL SERPL-MCNC: 248 MG/DL

## 2022-05-23 PROCEDURE — 80061 LIPID PANEL: CPT | Performed by: STUDENT IN AN ORGANIZED HEALTH CARE EDUCATION/TRAINING PROGRAM

## 2022-05-23 PROCEDURE — 36415 COLL VENOUS BLD VENIPUNCTURE: CPT | Performed by: STUDENT IN AN ORGANIZED HEALTH CARE EDUCATION/TRAINING PROGRAM

## 2022-05-23 PROCEDURE — 99396 PREV VISIT EST AGE 40-64: CPT | Mod: GC | Performed by: STUDENT IN AN ORGANIZED HEALTH CARE EDUCATION/TRAINING PROGRAM

## 2022-05-23 NOTE — PROGRESS NOTES
Preceptor Attestation:    I discussed the patient with the resident and evaluated the patient in person. I have verified the content of the note, which accurately reflects my assessment of the patient and the plan of care.   Supervising Physician:  Johan Carmona MD.

## 2022-05-23 NOTE — NURSING NOTE
Due to patient being non-English speaking/uses sign language, an  was used for this visit. Only for face-to-face interpretation by an external agency, date and length of interpretation can be found on the scanned worksheet.       name: Milo May  Language: [place]  Agency:  [employer]  Telephone number: [phone]  Type of interpretation:  Face-to-face, spoken

## 2022-05-23 NOTE — PROGRESS NOTES
Male Physical Note      Concerns today:   Patient injected his penis several years ago with an unknown substance which he was told would enlarge his penis.  He states that he has a hard part/scar on his penis now and is worried that there is still some substance in his penis.  He was seen last October for a CPE and referred to urology however he never received a call to schedule.    ROS:                      CONSTITUTIONAL: no fatigue, no unexpected change in weight  SKIN: no worrisome rashes, no worrisome moles, no worrisome lesions  EYES: no acute vision problems or changes  ENT: no ear problems, no mouth problems, no throat problems  RESP: no significant cough, no shortness of breath  CV: no chest pain, no palpitations, no new or worsening peripheral edema  GI: no nausea, no vomiting, no constipation, no diarrhea    No past medical history on file.     Family History   Problem Relation Age of Onset     Diabetes No family hx of      Coronary Artery Disease No family hx of      Breast Cancer No family hx of      Colon Cancer No family hx of      Prostate Cancer No family hx of      Other Cancer No family hx of      Reviewed no other significant FH           Family History and past Medical History reviewed and unchanged/updated.    Social History     Tobacco Use     Smoking status: Former Smoker     Types: Dip, chew, snus or snuff     Smokeless tobacco: Current User     Types: Chew     Tobacco comment: chews tobacco    Substance Use Topics     Alcohol use: Yes     Alcohol/week: 0.0 standard drinks       Children ? yes 28, 24,13 y/o    Has anyone hurt you physically, for example by pushing, hitting, slapping or kicking you or forcing you to have sex? Denies  Do you feel threatened or controlled by a partner, ex-partner or anyone in your life? Denies    RISK BEHAVIORS AND HEALTHY HABITS:  Tobacco Use/Smoking: None and Details chews tobacco 3x/day  Illicit Drug Use: None  ETOH: None  Do you use alcohol?  "No  Sexually Active: Yes  Diet (5-7 servings of fruits/veg daily): Yes   Exercise (30 min accumulated most days):Yes  Dental Care: Yes   Calcium 1500 mg/d:  Yes  Seat Belt Use: Yes     Cholesterol Level (>46 yo or at risk):  Recommended and patient accepted testing  Colon CA Screening (>50-75 ):  Date done within last 8 months  Result(s) negative FIT test  Lung CA Screening with low dose CT (55 - 80, with >= 30 ppy smoking history who are current smokers or quit within last 15y - annual low dose CT) Testing not indicated as pt only had very brief smoking history   PSA screening normal in Oct 2021    CV Risk based on Pooled Cohort Risk:  The 10-year ASCVD risk score (Jaiden TRJUILLO Jr., et al., 2013) is: 2.8%    Values used to calculate the score:      Age: 51 years      Sex: Male      Is Non- : No      Diabetic: No      Tobacco smoker: No      Systolic Blood Pressure: 114 mmHg      Is BP treated: No      HDL Cholesterol: 42 mg/dL      Total Cholesterol: 162 mg/dL      Immunization History   Administered Date(s) Administered     HepB 11/27/2008     Influenza (IIV3) PF 10/10/2012, 11/22/2017     Influenza Vaccine IM > 6 months Valent IIV4 (Alfuria,Fluzone) 10/08/2021     Influenza Vaccine, 6+MO IM (QUADRIVALENT W/PRESERVATIVES) 11/10/2015, 11/11/2016     MMR 11/27/2008, 01/21/2010     TD (ADULT, 7+) 11/27/2008, 01/21/2010     Tdap (Adacel,Boostrix) 05/11/2009, 10/08/2021     Reviewed Immunization Record Today    EXAMINATION:  /75 (BP Location: Left arm, Patient Position: Sitting, Cuff Size: Adult Regular)   Pulse 73   Temp 98.1  F (36.7  C) (Oral)   Resp 16   Ht 1.655 m (5' 5.16\")   Wt 64.6 kg (142 lb 6.4 oz)   SpO2 99%   BMI 23.58 kg/m    GENERAL: healthy, alert and no distress  EYES: Eyes grossly normal to inspection, extraocular movements - intact, and PERRL  HENT: ear canals- normal; TMs- normal; Nose- normal; Mouth- no ulcers, no lesions  NECK: no tenderness, no adenopathy, no " asymmetry, no masses, no stiffness; thyroid- normal to palpation  RESP: lungs clear to auscultation - no rales, no rhonchi, no wheezes  CV: regular rates and rhythm, normal S1 S2, no S3 or S4 and no murmur, no click or rub -  ABDOMEN: soft, no tenderness, no  hepatosplenomegaly, no masses, normal bowel sounds  MS: extremities- no gross deformities noted, no edema  SKIN: no suspicious lesions, no rashes  NEURO: strength and tone- normal, sensory exam- grossly normal, mentation- intact, speech- normal, reflexes- symmetric  BACK: no CVA tenderness, no paralumbar tenderness  - male: testicles- normal, no atrophy, no masses; Penis is edematous and enlarged with some scarring/fibrosis under the skin  PSYCH: Alert and oriented times 3; speech- coherent , normal rate and volume; able to articulate logical thoughts, able to abstract reason, no tangential thoughts, no hallucinations or delusions, affect- normal  LYMPHATICS: ant. cervical- normal, post. cervical- normal, axillary- normal, supraclavicular- normal, inguinal- normal    ASSESSMENT:  1. Health Care Maintenance: Normal Physical Exam  2. Swelling of penis    PLAN:  1.Lipid panel ordered. No other screening indicated at this time  2. Pt's penis is abnormal on exam due to injection of unknown substance, will refer to urology and enlist the help of our  to ensure he is scheduled.       Thierry Henry MD PGY3  [facility]

## 2022-07-12 ENCOUNTER — TELEPHONE (OUTPATIENT)
Dept: UROLOGY | Facility: CLINIC | Age: 52
End: 2022-07-12

## 2022-07-12 ENCOUNTER — OFFICE VISIT (OUTPATIENT)
Dept: UROLOGY | Facility: CLINIC | Age: 52
End: 2022-07-12
Attending: STUDENT IN AN ORGANIZED HEALTH CARE EDUCATION/TRAINING PROGRAM
Payer: COMMERCIAL

## 2022-07-12 ENCOUNTER — PREP FOR PROCEDURE (OUTPATIENT)
Dept: UROLOGY | Facility: CLINIC | Age: 52
End: 2022-07-12

## 2022-07-12 VITALS
OXYGEN SATURATION: 97 % | DIASTOLIC BLOOD PRESSURE: 74 MMHG | TEMPERATURE: 98 F | HEART RATE: 70 BPM | SYSTOLIC BLOOD PRESSURE: 106 MMHG

## 2022-07-12 DIAGNOSIS — M60.28: Primary | ICD-10-CM

## 2022-07-12 DIAGNOSIS — M60.28: ICD-10-CM

## 2022-07-12 PROCEDURE — 99244 OFF/OP CNSLTJ NEW/EST MOD 40: CPT | Performed by: UROLOGY

## 2022-07-12 NOTE — PROGRESS NOTES
S: Patient is a 51-year-old non-English Mauritian man who was requested to be seen by Dr. Henry for a consultation with regard to patient's penile swelling.  Patient injected a substance to his penis several years ago for enlargement.  Now he wants to have it removed if possible.  He has no urinary problems.  He has no erection issues.  He has some irritation from it at times.  Current Outpatient Medications   Medication Sig Dispense Refill     Acetaminophen (TYLENOL PO)  (Patient not taking: Reported on 10/8/2021)       Allergies   Allergen Reactions     Nkda [No Known Drug Allergies]      No past medical history on file.  No past surgical history on file.   Family History   Problem Relation Age of Onset     Diabetes No family hx of      Coronary Artery Disease No family hx of      Breast Cancer No family hx of      Colon Cancer No family hx of      Prostate Cancer No family hx of      Other Cancer No family hx of      Social History     Socioeconomic History     Marital status:    Tobacco Use     Smoking status: Former Smoker     Types: Dip, chew, snus or snuff     Smokeless tobacco: Current User     Types: Chew     Tobacco comment: chews tobacco    Substance and Sexual Activity     Alcohol use: Yes     Alcohol/week: 0.0 standard drinks     Drug use: No     Sexual activity: Yes       REVIEW OF SYSTEMS  =================  C: NEGATIVE for fever, chills, change in weight  I: NEGATIVE for worrisome rashes, moles or lesions  E/M: NEGATIVE for ear, mouth and throat problems  R: NEGATIVE for significant cough or SHORTNESS OF BREATH  CV:  NEGATIVE for chest pain, palpitations or peripheral edema  GI: NEGATIVE for nausea, abdominal pain, heartburn, or change in bowel habits  NEURO: NEGATIVE numbness/weakness  : see HPI  PSYCH: NEGATIVE depression/anxiety  LYmph: no new enlarged lymph nodes  Ortho: no new trauma/movements      Physical Exam:  Blood pressure 106/74, pulse 70, temperature 98  F (36.7  C), SpO2 97  %.    Patient is pleasant, in no acute distress, good general condition.  Heart:  negative, PMI normal  Lung: no evidence of respiratory distress    Abdomen: Soft, nondistended, non tender. No masses. No rebound or guarding.   Exam: Penis with large granuloma findings on the ventral aspect of the penile skin.  Testes no masses.  No scrotal skin lesion.  Skin: Warm and dry.  No redness.  Neuro: grossly normal  Musculaskeletal: moving all extremities  Psych normal mood and affect  Musculoskeletal  moving all extremities  Hematologic/Lymphatic/Immunologic: normal ant/post cervical, axillary, supraclavicular and inguinal nodes    Assessment/Plan:     51-year-old male with foreign body injection several years ago now with granuloma formation.  Patient is interested in surgical excision.  Risks and benefits of surgery discussed at length with patient.  I will schedule for this elective procedure near future.  Information obtained through [employer] .

## 2022-08-26 PROBLEM — M60.28: Status: ACTIVE | Noted: 2022-08-26

## 2022-08-26 NOTE — TELEPHONE ENCOUNTER
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    Date: 8-26-22    Imnai Ling  Financial Securing/Prior Auth Dept  [phone]

## 2022-08-26 NOTE — TELEPHONE ENCOUNTER
Called patient - family member relayed message and patient does want surgery   Kiesha Mujica M.A.

## 2022-09-15 ENCOUNTER — OFFICE VISIT (OUTPATIENT)
Dept: FAMILY MEDICINE | Facility: CLINIC | Age: 52
End: 2022-09-15
Payer: COMMERCIAL

## 2022-09-15 VITALS
OXYGEN SATURATION: 98 % | TEMPERATURE: 98.5 F | HEIGHT: 65 IN | WEIGHT: 149.4 LBS | RESPIRATION RATE: 20 BRPM | BODY MASS INDEX: 24.89 KG/M2 | DIASTOLIC BLOOD PRESSURE: 75 MMHG | HEART RATE: 72 BPM | SYSTOLIC BLOOD PRESSURE: 109 MMHG

## 2022-09-15 DIAGNOSIS — N48.89 SWELLING OF PENIS: ICD-10-CM

## 2022-09-15 DIAGNOSIS — Z23 HIGH PRIORITY FOR 2019-NCOV VACCINE: Primary | ICD-10-CM

## 2022-09-15 DIAGNOSIS — Z23 NEED FOR VACCINATION: ICD-10-CM

## 2022-09-15 DIAGNOSIS — Z23 NEED FOR PROPHYLACTIC VACCINATION AND INOCULATION AGAINST INFLUENZA: ICD-10-CM

## 2022-09-15 PROCEDURE — 0051A COVID-19,PF,PFIZER (12+ YRS): CPT

## 2022-09-15 PROCEDURE — 90471 IMMUNIZATION ADMIN: CPT

## 2022-09-15 PROCEDURE — 90686 IIV4 VACC NO PRSV 0.5 ML IM: CPT

## 2022-09-15 PROCEDURE — 91305 COVID-19,PF,PFIZER (12+ YRS): CPT

## 2022-09-15 PROCEDURE — 99212 OFFICE O/P EST SF 10 MIN: CPT | Mod: 25

## 2022-09-15 NOTE — PROGRESS NOTES
Preceptor Attestation:   Patient seen, evaluated and discussed with the resident. I have verified the content of the note, which accurately reflects my assessment of the patient and the plan of care.   Supervising Physician:  Yusuf Polanco MD

## 2022-09-15 NOTE — NURSING NOTE
Due to patient being non-English speaking/uses sign language, an  was used for this visit. Only for face-to-face interpretation by an external agency, date and length of interpretation can be found on the scanned worksheet.       name: Milo May  Language: Malaysian  Agency:  [employer]  Telephone number: [phone]  Type of interpretation:  Face-to-face, spoken

## 2022-09-15 NOTE — PROGRESS NOTES
"  Assessment & Plan     #High priority for 2019-nCoV vaccine  - COVID-19,PF,PFIZER (12+ Yrs Primary Series-GRAY LABEL)  -Will need second COVID-vaccine after 28 days    #Need for vaccination  - INFLUENZA VACCINE IM > 6 MONTHS VALENT IIV4 (AFLURIA/FLUZONE)    #Swelling of penis  Patient presenting for questions around upcoming surgery related to foreign body granuloma of penis.  Patient had a history of injecting a substance into his penis for several years for enlargement, has been seen by urology, Dr. Cornel Roldan.  Plan for surgical excision of foreign body.  Patient presenting due to confusion about urology appointment date and how to contact their clinic, unsure of process.  Reviewed this with patient and provided them the urologist name, clinic location, clinic phone number.  -Scheduled for a preop 9/19/2022      Stacey Herrera MD  [facility] YOVANY Villa is a 52 year old accompanied by his spouse and , presenting for the following health issues:  Other (Pt is due for surgery however surgery got canceled and not sure is the process )     Confusion about urology appointment date and how to contact their clinic, unsure of process.  Wife says that she was called by the clinic though they did not have an , she told them that she needs an  and they hung up.  She was concerned that the surgery was canceled.     Swelling of penis: No changes, this has been an ongoing issue.  No issues with urination.      Review of Systems   As above      Objective    /75   Pulse 72   Temp 98.5  F (36.9  C) (Oral)   Resp 20   Ht 1.656 m (5' 5.2\")   Wt 67.8 kg (149 lb 6.4 oz)   SpO2 98%   BMI 24.71 kg/m    Body mass index is 24.71 kg/m .  Physical Exam  Constitutional:       General: No acute distress.     Appearance: Normal appearance. Patient not ill-appearing or diaphoretic.   Eyes:      Extraocular Movements: Extraocular movements intact.      " Conjunctiva/sclera: Conjunctivae normal.   Pulmonary:      Effort: Pulmonary effort is normal. No respiratory distress.   Neurological:      General: No focal deficit present.      Mental Status: Patient is alert and oriented to person, place, and time.      Comments: No gross deficits appreciated   Psychiatric:         Attention and Perception: Attention normal.         Mood and Affect: Mood normal.         Speech: Speech normal.         Behavior: Behavior normal.

## 2022-09-19 ENCOUNTER — OFFICE VISIT (OUTPATIENT)
Dept: FAMILY MEDICINE | Facility: CLINIC | Age: 52
End: 2022-09-19
Payer: COMMERCIAL

## 2022-09-19 VITALS
RESPIRATION RATE: 24 BRPM | OXYGEN SATURATION: 100 % | DIASTOLIC BLOOD PRESSURE: 75 MMHG | WEIGHT: 147.4 LBS | SYSTOLIC BLOOD PRESSURE: 111 MMHG | HEIGHT: 65 IN | TEMPERATURE: 97.9 F | BODY MASS INDEX: 24.56 KG/M2 | HEART RATE: 65 BPM

## 2022-09-19 DIAGNOSIS — Z12.11 SCREEN FOR COLON CANCER: ICD-10-CM

## 2022-09-19 DIAGNOSIS — Z01.818 PREOP GENERAL PHYSICAL EXAM: Primary | ICD-10-CM

## 2022-09-19 PROCEDURE — 99213 OFFICE O/P EST LOW 20 MIN: CPT | Mod: GC

## 2022-09-19 NOTE — H&P (VIEW-ONLY)
[facility] [address] [place] [address]  Phone: [phone]  Fax: [phone]  Primary Provider: Fela Hodgson  Pre-op Performing Provider: SONDRA MACIEL      PREOPERATIVE EVALUATION:  Today's date: 9/19/2022    Allen Donnelly is a 52 year old male who presents for a preoperative evaluation.    Surgical Information:  Surgery/Procedure: Penile mass excision  Surgery Location: [facility]  Surgeon: Cornel Roldan  Surgery Date: 9/29/2022  Time of Surgery: 2:45 PM  Where patient plans to recover: At home with family  Fax number for surgical facility: Note does not need to be faxed, will be available electronically in Epic.    Type of Anesthesia Anticipated: to be determined    Assessment & Plan     The proposed surgical procedure is considered LOW risk.    Preop general physical exam  No concerning findings on history or exam.  No bleeding history.  Patient does use occasional betel nut but does not smoke cigarettes.  No concerns on heart or lung exam.  Surgery is on 9/29/2022 for penile mass secondary to injection of unknown substance.  Patient is cleared for this procedure.         Risks and Recommendations:  The patient has the following additional risks and recommendations for perioperative complications:   - No identified additional risk factors other than previously addressed      RECOMMENDATION:  APPROVAL GIVEN to proceed with proposed procedure, without further diagnostic evaluation.        Subjective     HPI related to upcoming procedure: Patient has a penile mass due to secondary to injection of unknown substance intended for enlargement.    Preop Questions 9/19/2022   1. Have you ever had a heart attack or stroke? No   2. Have you ever had surgery on your heart or blood vessels, such as a stent placement, a coronary artery bypass, or surgery on an artery in your head, neck, heart, or legs? No   3. Do you have chest pain with activity? No   4.  Do you have a history of  heart failure? No   5. Do you currently have a cold, bronchitis or symptoms of other infection? No   6. Do you have a cough, shortness of breath, or wheezing? No   7. Do you or anyone in your family have previous history of blood clots? No   8. Do you or does anyone in your family have a serious bleeding problem such as prolonged bleeding following surgeries or cuts? No   9. Have you ever had problems with anemia or been told to take iron pills? No   10. Have you had any abnormal blood loss such as black, tarry or bloody stools? No   11. Have you ever had a blood transfusion? No   12. Are you willing to have a blood transfusion if it is medically needed before, during, or after your surgery? Yes   13. Have you or any of your relatives ever had problems with anesthesia? No   14. Do you have sleep apnea, excessive snoring or daytime drowsiness? No   15. Do you have any artifical heart valves or other implanted medical devices like a pacemaker, defibrillator, or continuous glucose monitor? No   16. Do you have artificial joints? No   17. Are you allergic to latex? No       Review of Systems  ENT/MOUTH: NEGATIVE for ear, mouth and throat problems  RESP: NEGATIVE for significant cough or SOB  CV: NEGATIVE for chest pain, palpitations or peripheral edema  GI: NEGATIVE for nausea, abdominal pain, heartburn, or change in bowel habits  : NEGATIVE for frequency, dysuria, or hematuria  MUSCULOSKELETAL: NEGATIVE for significant arthralgias or myalgia  NEURO: NEGATIVE for weakness, dizziness or paresthesias  HEME: NEGATIVE for bleeding problems    Patient Active Problem List    Diagnosis Date Noted     Foreign body granuloma of penis 08/26/2022     Priority: Medium     Added automatically from request for surgery [id number]       Low vitamin D level 11/27/2017     Priority: Medium     Poor appetite 11/22/2017     Priority: Medium     Difficulty sleeping 11/22/2017     Priority: Medium     Musculoskeletal  back pain 11/22/2017     Priority: Medium     Scalp mass 11/11/2016     Priority: Medium     Likely lipoma or cyst. Referred to general surgery for excision 11/11/16.         No past medical history on file.  No past surgical history on file.  Current Outpatient Medications   Medication Sig Dispense Refill     Acetaminophen (TYLENOL PO)  (Patient not taking: No sig reported)         Allergies   Allergen Reactions     Nkda [No Known Drug Allergies]         Social History     Tobacco Use     Smoking status: Former Smoker     Types: Dip, chew, snus or snuff     Smokeless tobacco: Current User     Types: Chew     Tobacco comment: chews tobacco    Substance Use Topics     Alcohol use: Yes     Alcohol/week: 0.0 standard drinks     Family History   Problem Relation Age of Onset     Diabetes No family hx of      Coronary Artery Disease No family hx of      Breast Cancer No family hx of      Colon Cancer No family hx of      Prostate Cancer No family hx of      Other Cancer No family hx of      History   Drug Use No         Objective     There were no vitals taken for this visit.    Physical Exam    GENERAL APPEARANCE: healthy, alert and no distress     EYES: EOMI     HENT: nose and mouth without ulcers or lesions. Has his own teeth with beetle nut staining.     NECK: no adenopathy, no asymmetry, masses, or scars and thyroid normal to palpation     RESP: lungs clear to auscultation - no rales, rhonchi or wheezes     CV: regular rates and rhythm, normal S1 S2, no S3 or S4 and no murmur, click or rub        MS: extremities normal- no gross deformities noted, no evidence of inflammation in joints     SKIN: no suspicious lesions or rashes     NEURO: Normal strength and tone, sensory exam grossly normal, mentation intact and speech normal     PSYCH: mentation appears normal. and affect normal/bright     LYMPHATICS: No cervical adenopathy    No results for input(s): HGB, PLT, INR, NA, POTASSIUM, CR, A1C in the last 99230 hours.      Diagnostics:  No results found for this or any previous visit (from the past 720 hour(s)).   No EKG required for low risk surgery (cataract, skin procedure, breast biopsy, etc).    Revised Cardiac Risk Index (RCRI):  The patient has the following serious cardiovascular risks for perioperative complications:    - No serious cardiac risks = 0 points     RCRI Interpretation: 0 points: Class I (very low risk - 0.4% complication rate)           Signed Electronically by: Will Ellison MD  Copy of this evaluation report is provided to requesting physician.

## 2022-09-19 NOTE — NURSING NOTE
Due to patient being non-English speaking/uses sign language, an  was used for this visit. Only for face-to-face interpretation by an external agency, date and length of interpretation can be found on the scanned worksheet.     name: Milo May  Agency: [employer]  Language: Katharine   Telephone number: [phone]  Type of interpretation: Face-to-face, spoken

## 2022-09-19 NOTE — PATIENT INSTRUCTIONS
Preparing for Your Surgery  Getting started  A nurse will call you to review your health history and instructions. They will give you an arrival time based on your scheduled surgery time. Please be ready to share:    Your doctor's clinic name and phone number    Your medical, surgical and anesthesia history    A list of allergies and sensitivities    A list of medicines, including herbal treatments and over-the-counter drugs    Whether the patient has a legal guardian (ask how to send us the papers in advance)  Please tell us if you're pregnant--or if there's any chance you might be pregnant. Some surgeries may injure a fetus (unborn baby), so they require a pregnancy test. Surgeries that are safe for a fetus don't always need a test, and you can choose whether to have one.   If you have a child who's having surgery, please ask for a copy of Preparing for Your Child's Surgery.    Preparing for surgery    Within 30 days of surgery: Have a pre-op exam (sometimes called an H&P, or History and Physical). This can be done at a clinic or pre-operative center.  ? If you're having a , you may not need this exam. Talk to your care team.    At your pre-op exam, talk to your care team about all medicines you take. If you need to stop any medicines before surgery, ask when to start taking them again.  ? We do this for your safety. Many medicines can make you bleed too much during surgery. Some change how well surgery (anesthesia) drugs work.    Call your insurance company to let them know you're having surgery. (If you don't have insurance, call [phone].)    Call your clinic if there's any change in your health. This includes signs of a cold or flu (sore throat, runny nose, cough, rash, fever). It also includes a scrape or scratch near the surgery site.    If you have questions on the day of surgery, call your hospital or surgery center.  COVID testing  You may need to be tested for COVID-19 before having  surgery. If so, we will give you instructions.  Eating and drinking guidelines  For your safety: Unless your surgeon tells you otherwise, follow the guidelines below.    Eat and drink as usual until 8 hours before surgery. After that, no food or milk.    Drink clear liquids until 2 hours before surgery. These are liquids you can see through, like water, Gatorade and Propel Water. You may also have black coffee and tea (no cream or milk).    Nothing by mouth within 2 hours of surgery. This includes gum, candy and breath mints.    If you drink alcohol: Stop drinking it the night before surgery.    If your care team tells you to take medicine on the morning of surgery, it's okay to take it with a sip of water.  Preventing infection    Shower or bathe the night before and morning of your surgery. Follow the instructions your clinic gave you. (If no instructions, use regular soap.)    Don't shave or clip hair near your surgery site. We'll remove the hair if needed.    Don't smoke or vape the morning of surgery. You may chew nicotine gum up to 2 hours before surgery. A nicotine patch is okay.  ? Note: Some surgeries require you to completely quit smoking and nicotine. Check with your surgeon.    Your care team will make every effort to keep you safe from infection. We will:  ? Clean our hands often with soap and water (or an alcohol-based hand rub).  ? Clean the skin at your surgery site with a special soap that kills germs.  ? Give you a special gown to keep you warm. (Cold raises the risk of infection.)  ? Wear special hair covers, masks, gowns and gloves during surgery.  ? Give antibiotic medicine, if prescribed. Not all surgeries need antibiotics.  What to bring on the day of surgery    Photo ID and insurance card    Copy of your health care directive, if you have one    Glasses and hearing aides (bring cases)  ? You can't wear contacts during surgery    Inhaler and eye drops, if you use them (tell us about these when  you arrive)    CPAP machine or breathing device, if you use them    A few personal items, if spending the night    If you have . . .  ? A pacemaker, ICD (cardiac defibrillator) or other implant: Bring the ID card.  ? An implanted stimulator: Bring the remote control.  ? A legal guardian: Bring a copy of the certified (court-stamped) guardianship papers.  Please remove any jewelry, including body piercings. Leave jewelry and other valuables at home.  If you're going home the day of surgery    You must have a responsible adult drive you home. They should stay with you overnight as well.    If you don't have someone to stay with you, and you aren't safe to go home alone, we may keep you overnight. Insurance often won't pay for this.  After surgery  If it's hard to control your pain or you need more pain medicine, please call your surgeon's office.  Questions?   If you have any questions for your care team, list them here: _________________________________________________________________________________________________________________________________________________________________________ ____________________________________ ____________________________________ ____________________________________  For informational purposes only. Not to replace the advice of your health care provider. Copyright   2003, 2019 [facility]. All rights reserved. Clinically reviewed by Tala Cordero MD. [employer] [id number] - REV 07/21.

## 2022-09-19 NOTE — PROGRESS NOTES
Preceptor Attestation:    I discussed the patient with the resident and evaluated the patient in person. I have verified the content of the note, which accurately reflects my assessment of the patient and the plan of care.   Supervising Physician:  Hamilton Vallejo MD.

## 2022-09-19 NOTE — PROGRESS NOTES
[facility] [address] [place] [address]  Phone: [phone]  Fax: [phone]  Primary Provider: Fela Hodgson  Pre-op Performing Provider: SONDRA MACIEL      PREOPERATIVE EVALUATION:  Today's date: 9/19/2022    Allen Donnelly is a 52 year old male who presents for a preoperative evaluation.    Surgical Information:  Surgery/Procedure: Penile mass excision  Surgery Location: [facility]  Surgeon: Cornel Roldan  Surgery Date: 9/29/2022  Time of Surgery: 2:45 PM  Where patient plans to recover: At home with family  Fax number for surgical facility: Note does not need to be faxed, will be available electronically in Epic.    Type of Anesthesia Anticipated: to be determined    Assessment & Plan     The proposed surgical procedure is considered LOW risk.    Preop general physical exam  No concerning findings on history or exam.  No bleeding history.  Patient does use occasional betel nut but does not smoke cigarettes.  No concerns on heart or lung exam.  Surgery is on 9/29/2022 for penile mass secondary to injection of unknown substance.  Patient is cleared for this procedure.         Risks and Recommendations:  The patient has the following additional risks and recommendations for perioperative complications:   - No identified additional risk factors other than previously addressed      RECOMMENDATION:  APPROVAL GIVEN to proceed with proposed procedure, without further diagnostic evaluation.        Subjective     HPI related to upcoming procedure: Patient has a penile mass due to secondary to injection of unknown substance intended for enlargement.    Preop Questions 9/19/2022   1. Have you ever had a heart attack or stroke? No   2. Have you ever had surgery on your heart or blood vessels, such as a stent placement, a coronary artery bypass, or surgery on an artery in your head, neck, heart, or legs? No   3. Do you have chest pain with activity? No   4.  Do you have a history of  heart failure? No   5. Do you currently have a cold, bronchitis or symptoms of other infection? No   6. Do you have a cough, shortness of breath, or wheezing? No   7. Do you or anyone in your family have previous history of blood clots? No   8. Do you or does anyone in your family have a serious bleeding problem such as prolonged bleeding following surgeries or cuts? No   9. Have you ever had problems with anemia or been told to take iron pills? No   10. Have you had any abnormal blood loss such as black, tarry or bloody stools? No   11. Have you ever had a blood transfusion? No   12. Are you willing to have a blood transfusion if it is medically needed before, during, or after your surgery? Yes   13. Have you or any of your relatives ever had problems with anesthesia? No   14. Do you have sleep apnea, excessive snoring or daytime drowsiness? No   15. Do you have any artifical heart valves or other implanted medical devices like a pacemaker, defibrillator, or continuous glucose monitor? No   16. Do you have artificial joints? No   17. Are you allergic to latex? No       Review of Systems  ENT/MOUTH: NEGATIVE for ear, mouth and throat problems  RESP: NEGATIVE for significant cough or SOB  CV: NEGATIVE for chest pain, palpitations or peripheral edema  GI: NEGATIVE for nausea, abdominal pain, heartburn, or change in bowel habits  : NEGATIVE for frequency, dysuria, or hematuria  MUSCULOSKELETAL: NEGATIVE for significant arthralgias or myalgia  NEURO: NEGATIVE for weakness, dizziness or paresthesias  HEME: NEGATIVE for bleeding problems    Patient Active Problem List    Diagnosis Date Noted     Foreign body granuloma of penis 08/26/2022     Priority: Medium     Added automatically from request for surgery [id number]       Low vitamin D level 11/27/2017     Priority: Medium     Poor appetite 11/22/2017     Priority: Medium     Difficulty sleeping 11/22/2017     Priority: Medium     Musculoskeletal  back pain 11/22/2017     Priority: Medium     Scalp mass 11/11/2016     Priority: Medium     Likely lipoma or cyst. Referred to general surgery for excision 11/11/16.         No past medical history on file.  No past surgical history on file.  Current Outpatient Medications   Medication Sig Dispense Refill     Acetaminophen (TYLENOL PO)  (Patient not taking: No sig reported)         Allergies   Allergen Reactions     Nkda [No Known Drug Allergies]         Social History     Tobacco Use     Smoking status: Former Smoker     Types: Dip, chew, snus or snuff     Smokeless tobacco: Current User     Types: Chew     Tobacco comment: chews tobacco    Substance Use Topics     Alcohol use: Yes     Alcohol/week: 0.0 standard drinks     Family History   Problem Relation Age of Onset     Diabetes No family hx of      Coronary Artery Disease No family hx of      Breast Cancer No family hx of      Colon Cancer No family hx of      Prostate Cancer No family hx of      Other Cancer No family hx of      History   Drug Use No         Objective     There were no vitals taken for this visit.    Physical Exam    GENERAL APPEARANCE: healthy, alert and no distress     EYES: EOMI     HENT: nose and mouth without ulcers or lesions. Has his own teeth with beetle nut staining.     NECK: no adenopathy, no asymmetry, masses, or scars and thyroid normal to palpation     RESP: lungs clear to auscultation - no rales, rhonchi or wheezes     CV: regular rates and rhythm, normal S1 S2, no S3 or S4 and no murmur, click or rub        MS: extremities normal- no gross deformities noted, no evidence of inflammation in joints     SKIN: no suspicious lesions or rashes     NEURO: Normal strength and tone, sensory exam grossly normal, mentation intact and speech normal     PSYCH: mentation appears normal. and affect normal/bright     LYMPHATICS: No cervical adenopathy    No results for input(s): HGB, PLT, INR, NA, POTASSIUM, CR, A1C in the last 55859 hours.      Diagnostics:  No results found for this or any previous visit (from the past 720 hour(s)).   No EKG required for low risk surgery (cataract, skin procedure, breast biopsy, etc).    Revised Cardiac Risk Index (RCRI):  The patient has the following serious cardiovascular risks for perioperative complications:    - No serious cardiac risks = 0 points     RCRI Interpretation: 0 points: Class I (very low risk - 0.4% complication rate)           Signed Electronically by: Will Ellison MD  Copy of this evaluation report is provided to requesting physician.

## 2022-09-26 ENCOUNTER — LAB (OUTPATIENT)
Dept: FAMILY MEDICINE | Facility: CLINIC | Age: 52
End: 2022-09-26
Payer: COMMERCIAL

## 2022-09-26 DIAGNOSIS — Z20.822 ENCOUNTER FOR LABORATORY TESTING FOR COVID-19 VIRUS: ICD-10-CM

## 2022-09-26 PROCEDURE — U0003 INFECTIOUS AGENT DETECTION BY NUCLEIC ACID (DNA OR RNA); SEVERE ACUTE RESPIRATORY SYNDROME CORONAVIRUS 2 (SARS-COV-2) (CORONAVIRUS DISEASE [COVID-19]), AMPLIFIED PROBE TECHNIQUE, MAKING USE OF HIGH THROUGHPUT TECHNOLOGIES AS DESCRIBED BY CMS-2020-01-R: HCPCS

## 2022-09-26 PROCEDURE — U0005 INFEC AGEN DETEC AMPLI PROBE: HCPCS

## 2022-09-27 LAB — SARS-COV-2 RNA RESP QL NAA+PROBE: NEGATIVE

## 2022-09-28 ENCOUNTER — ANESTHESIA EVENT (OUTPATIENT)
Dept: SURGERY | Facility: AMBULATORY SURGERY CENTER | Age: 52
End: 2022-09-28
Payer: COMMERCIAL

## 2022-09-29 ENCOUNTER — HOSPITAL ENCOUNTER (OUTPATIENT)
Facility: AMBULATORY SURGERY CENTER | Age: 52
Discharge: HOME | End: 2022-09-29
Attending: UROLOGY | Admitting: UROLOGY
Payer: COMMERCIAL

## 2022-09-29 ENCOUNTER — ANESTHESIA (OUTPATIENT)
Dept: SURGERY | Facility: AMBULATORY SURGERY CENTER | Age: 52
End: 2022-09-29
Payer: COMMERCIAL

## 2022-09-29 VITALS
OXYGEN SATURATION: 99 % | HEART RATE: 77 BPM | SYSTOLIC BLOOD PRESSURE: 117 MMHG | RESPIRATION RATE: 16 BRPM | TEMPERATURE: 99.3 F | DIASTOLIC BLOOD PRESSURE: 78 MMHG

## 2022-09-29 DIAGNOSIS — M60.28: ICD-10-CM

## 2022-09-29 PROCEDURE — 11426 EXC H-F-NK-SP B9+MARG >4 CM: CPT

## 2022-09-29 PROCEDURE — G8916 PT W IV AB GIVEN ON TIME: HCPCS

## 2022-09-29 PROCEDURE — G8907 PT DOC NO EVENTS ON DISCHARG: HCPCS

## 2022-09-29 PROCEDURE — 88307 TISSUE EXAM BY PATHOLOGIST: CPT | Performed by: PATHOLOGY

## 2022-09-29 PROCEDURE — 11426 EXC H-F-NK-SP B9+MARG >4 CM: CPT | Performed by: UROLOGY

## 2022-09-29 RX ORDER — SODIUM CHLORIDE, SODIUM LACTATE, POTASSIUM CHLORIDE, CALCIUM CHLORIDE 600; 310; 30; 20 MG/100ML; MG/100ML; MG/100ML; MG/100ML
INJECTION, SOLUTION INTRAVENOUS CONTINUOUS
Status: DISCONTINUED | OUTPATIENT
Start: 2022-09-29 | End: 2022-09-30 | Stop reason: HOSPADM

## 2022-09-29 RX ORDER — ONDANSETRON 2 MG/ML
INJECTION INTRAMUSCULAR; INTRAVENOUS PRN
Status: DISCONTINUED | OUTPATIENT
Start: 2022-09-29 | End: 2022-09-29

## 2022-09-29 RX ORDER — ONDANSETRON 2 MG/ML
4 INJECTION INTRAMUSCULAR; INTRAVENOUS EVERY 30 MIN PRN
Status: DISCONTINUED | OUTPATIENT
Start: 2022-09-29 | End: 2022-09-30 | Stop reason: HOSPADM

## 2022-09-29 RX ORDER — ACETAMINOPHEN 325 MG/1
975 TABLET ORAL ONCE
Status: COMPLETED | OUTPATIENT
Start: 2022-09-29 | End: 2022-09-29

## 2022-09-29 RX ORDER — CEFAZOLIN SODIUM 2 G/100ML
2 INJECTION, SOLUTION INTRAVENOUS SEE ADMIN INSTRUCTIONS
Status: DISCONTINUED | OUTPATIENT
Start: 2022-09-29 | End: 2022-09-30 | Stop reason: HOSPADM

## 2022-09-29 RX ORDER — LIDOCAINE HYDROCHLORIDE 20 MG/ML
INJECTION, SOLUTION INFILTRATION; PERINEURAL PRN
Status: DISCONTINUED | OUTPATIENT
Start: 2022-09-29 | End: 2022-09-29

## 2022-09-29 RX ORDER — PROPOFOL 10 MG/ML
INJECTION, EMULSION INTRAVENOUS CONTINUOUS PRN
Status: DISCONTINUED | OUTPATIENT
Start: 2022-09-29 | End: 2022-09-29

## 2022-09-29 RX ORDER — HYDRALAZINE HYDROCHLORIDE 20 MG/ML
2.5-5 INJECTION INTRAMUSCULAR; INTRAVENOUS EVERY 10 MIN PRN
Status: DISCONTINUED | OUTPATIENT
Start: 2022-09-29 | End: 2022-09-30 | Stop reason: HOSPADM

## 2022-09-29 RX ORDER — CEFAZOLIN SODIUM 2 G/100ML
2 INJECTION, SOLUTION INTRAVENOUS
Status: COMPLETED | OUTPATIENT
Start: 2022-09-29 | End: 2022-09-29

## 2022-09-29 RX ORDER — HYDROCODONE BITARTRATE AND ACETAMINOPHEN 5; 325 MG/1; MG/1
1-2 TABLET ORAL EVERY 4 HOURS PRN
Qty: 10 TABLET | Refills: 0 | Status: SHIPPED | OUTPATIENT
Start: 2022-09-29 | End: 2022-10-31

## 2022-09-29 RX ORDER — BACITRACIN ZINC 500 [USP'U]/G
OINTMENT TOPICAL PRN
Status: DISCONTINUED | OUTPATIENT
Start: 2022-09-29 | End: 2022-09-29 | Stop reason: HOSPADM

## 2022-09-29 RX ORDER — LIDOCAINE HYDROCHLORIDE 10 MG/ML
INJECTION, SOLUTION INFILTRATION; PERINEURAL PRN
Status: DISCONTINUED | OUTPATIENT
Start: 2022-09-29 | End: 2022-09-29 | Stop reason: HOSPADM

## 2022-09-29 RX ORDER — PHENYLEPHRINE HYDROCHLORIDE 10 MG/ML
INJECTION INTRAVENOUS PRN
Status: DISCONTINUED | OUTPATIENT
Start: 2022-09-29 | End: 2022-09-29

## 2022-09-29 RX ORDER — ONDANSETRON 4 MG/1
4 TABLET, ORALLY DISINTEGRATING ORAL EVERY 30 MIN PRN
Status: DISCONTINUED | OUTPATIENT
Start: 2022-09-29 | End: 2022-09-30 | Stop reason: HOSPADM

## 2022-09-29 RX ORDER — FENTANYL CITRATE 50 UG/ML
INJECTION, SOLUTION INTRAMUSCULAR; INTRAVENOUS PRN
Status: DISCONTINUED | OUTPATIENT
Start: 2022-09-29 | End: 2022-09-29

## 2022-09-29 RX ORDER — FENTANYL CITRATE 50 UG/ML
25 INJECTION, SOLUTION INTRAMUSCULAR; INTRAVENOUS
Status: DISCONTINUED | OUTPATIENT
Start: 2022-09-29 | End: 2022-09-30 | Stop reason: HOSPADM

## 2022-09-29 RX ORDER — LIDOCAINE 40 MG/G
CREAM TOPICAL
Status: DISCONTINUED | OUTPATIENT
Start: 2022-09-29 | End: 2022-09-30 | Stop reason: HOSPADM

## 2022-09-29 RX ORDER — DEXAMETHASONE SODIUM PHOSPHATE 4 MG/ML
INJECTION, SOLUTION INTRA-ARTICULAR; INTRALESIONAL; INTRAMUSCULAR; INTRAVENOUS; SOFT TISSUE PRN
Status: DISCONTINUED | OUTPATIENT
Start: 2022-09-29 | End: 2022-09-29

## 2022-09-29 RX ORDER — OXYCODONE HYDROCHLORIDE 5 MG/1
5 TABLET ORAL EVERY 4 HOURS PRN
Status: DISCONTINUED | OUTPATIENT
Start: 2022-09-29 | End: 2022-09-30 | Stop reason: HOSPADM

## 2022-09-29 RX ORDER — PROPOFOL 10 MG/ML
INJECTION, EMULSION INTRAVENOUS PRN
Status: DISCONTINUED | OUTPATIENT
Start: 2022-09-29 | End: 2022-09-29

## 2022-09-29 RX ORDER — METOPROLOL TARTRATE 1 MG/ML
1-2 INJECTION, SOLUTION INTRAVENOUS EVERY 5 MIN PRN
Status: DISCONTINUED | OUTPATIENT
Start: 2022-09-29 | End: 2022-09-30 | Stop reason: HOSPADM

## 2022-09-29 RX ORDER — FENTANYL CITRATE 50 UG/ML
25 INJECTION, SOLUTION INTRAMUSCULAR; INTRAVENOUS EVERY 5 MIN PRN
Status: DISCONTINUED | OUTPATIENT
Start: 2022-09-29 | End: 2022-09-30 | Stop reason: HOSPADM

## 2022-09-29 RX ADMIN — FENTANYL CITRATE 25 MCG: 50 INJECTION, SOLUTION INTRAMUSCULAR; INTRAVENOUS at 15:55

## 2022-09-29 RX ADMIN — LIDOCAINE HYDROCHLORIDE 80 MG: 20 INJECTION, SOLUTION INFILTRATION; PERINEURAL at 14:34

## 2022-09-29 RX ADMIN — ONDANSETRON 4 MG: 2 INJECTION INTRAMUSCULAR; INTRAVENOUS at 14:40

## 2022-09-29 RX ADMIN — PHENYLEPHRINE HYDROCHLORIDE 100 MCG: 10 INJECTION INTRAVENOUS at 15:25

## 2022-09-29 RX ADMIN — SODIUM CHLORIDE, SODIUM LACTATE, POTASSIUM CHLORIDE, CALCIUM CHLORIDE: 600; 310; 30; 20 INJECTION, SOLUTION INTRAVENOUS at 15:28

## 2022-09-29 RX ADMIN — PROPOFOL 100 MCG/KG/MIN: 10 INJECTION, EMULSION INTRAVENOUS at 14:34

## 2022-09-29 RX ADMIN — DEXAMETHASONE SODIUM PHOSPHATE 4 MG: 4 INJECTION, SOLUTION INTRA-ARTICULAR; INTRALESIONAL; INTRAMUSCULAR; INTRAVENOUS; SOFT TISSUE at 14:40

## 2022-09-29 RX ADMIN — PHENYLEPHRINE HYDROCHLORIDE 100 MCG: 10 INJECTION INTRAVENOUS at 15:09

## 2022-09-29 RX ADMIN — SODIUM CHLORIDE, SODIUM LACTATE, POTASSIUM CHLORIDE, CALCIUM CHLORIDE: 600; 310; 30; 20 INJECTION, SOLUTION INTRAVENOUS at 13:39

## 2022-09-29 RX ADMIN — FENTANYL CITRATE 50 MCG: 50 INJECTION, SOLUTION INTRAMUSCULAR; INTRAVENOUS at 14:34

## 2022-09-29 RX ADMIN — PHENYLEPHRINE HYDROCHLORIDE 100 MCG: 10 INJECTION INTRAVENOUS at 15:20

## 2022-09-29 RX ADMIN — PHENYLEPHRINE HYDROCHLORIDE 100 MCG: 10 INJECTION INTRAVENOUS at 14:57

## 2022-09-29 RX ADMIN — PROPOFOL 150 MG: 10 INJECTION, EMULSION INTRAVENOUS at 14:34

## 2022-09-29 RX ADMIN — CEFAZOLIN SODIUM 2 G: 2 INJECTION, SOLUTION INTRAVENOUS at 14:26

## 2022-09-29 RX ADMIN — OXYCODONE HYDROCHLORIDE 5 MG: 5 TABLET ORAL at 16:19

## 2022-09-29 RX ADMIN — ACETAMINOPHEN 975 MG: 325 TABLET ORAL at 13:39

## 2022-09-29 NOTE — ANESTHESIA POSTPROCEDURE EVALUATION
Patient: Allen Donnelly    Procedure: Procedure(s):  PENIS mass excision       Anesthesia Type:  General    Note:  Disposition: Outpatient   Postop Pain Control: Uneventful            Sign Out: Well controlled pain   PONV: No   Neuro/Psych: Uneventful            Sign Out: Acceptable/Baseline neuro status   Airway/Respiratory: Uneventful            Sign Out: Acceptable/Baseline resp. status   CV/Hemodynamics: Uneventful            Sign Out: Acceptable CV status   Other NRE: NONE   DID A NON-ROUTINE EVENT OCCUR? No           Last vitals:  Vitals Value Taken Time   /77 09/29/22 1600   Temp 37.4  C (99.3  F) 09/29/22 1541   Pulse 74 09/29/22 1600   Resp 9 09/29/22 1600   SpO2 99 % 09/29/22 1600       Electronically Signed By: Yoshi Mathew MD  September 29, 2022  4:12 PM

## 2022-09-29 NOTE — ANESTHESIA PREPROCEDURE EVALUATION
Anesthesia Pre-Procedure Evaluation    Patient: Allen Donnelly   MRN: [id number] : 1970        Procedure : Procedure(s):  PENIS mass excision          No past medical history on file.   History reviewed. No pertinent surgical history.   Allergies   Allergen Reactions     Nkda [No Known Drug Allergies]       Social History     Tobacco Use     Smoking status: Former Smoker     Types: Dip, chew, snus or snuff     Smokeless tobacco: Current User     Types: Chew     Tobacco comment: chews tobacco    Substance Use Topics     Alcohol use: Yes     Alcohol/week: 0.0 standard drinks      Wt Readings from Last 1 Encounters:   22 66.9 kg (147 lb 6.4 oz)        Anesthesia Evaluation            ROS/MED HX  ENT/Pulmonary:  - neg pulmonary ROS     Neurologic:  - neg neurologic ROS     Cardiovascular:  - neg cardiovascular ROS     METS/Exercise Tolerance:     Hematologic:  - neg hematologic  ROS     Musculoskeletal:  - neg musculoskeletal ROS     GI/Hepatic:  - neg GI/hepatic ROS     Renal/Genitourinary:  - neg Renal ROS     Endo:  - neg endo ROS     Psychiatric/Substance Use:  - neg psychiatric ROS     Infectious Disease:  - neg infectious disease ROS     Malignancy:  - neg malignancy ROS     Other:  - neg other ROS          Physical Exam    Airway  airway exam normal      Mallampati: II   TM distance: > 3 FB   Neck ROM: full   Mouth opening: > 3 cm    Respiratory Devices and Support         Dental  no notable dental history         Cardiovascular          Rhythm and rate: regular and normal     Pulmonary   pulmonary exam normal        breath sounds clear to auscultation           OUTSIDE LABS:  CBC:   Lab Results   Component Value Date    HGB 13.7 2017    HGB 14.7 2013    HCT 43.6 2017    HCT 45.3 2013     BMP:   Lab Results   Component Value Date    .6 (H) 2017     2013    POTASSIUM 4.2 2017    POTASSIUM 4.6 2013    CHLORIDE 106.4 2017    CHLORIDE 101  07/18/2013    CO2 29.4 11/22/2017    BUN 15.1 11/22/2017    BUN 14 07/18/2013    CR 0.8 11/22/2017    CR 0.93 07/18/2013    .6 (H) 11/22/2017     07/18/2013     COAGS: No results found for: PTT, INR, FIBR  POC: No results found for: BGM, HCG, HCGS  HEPATIC:   Lab Results   Component Value Date    ALBUMIN 4.2 07/18/2013    PROTTOTAL 7.1 11/22/2017    ALT 26.3 11/22/2017    AST 26.0 11/22/2017    ALKPHOS 51.8 11/22/2017    BILITOTAL 0.5 11/22/2017    BILIDIRECT 0.2 07/18/2013     OTHER:   Lab Results   Component Value Date    JEN 9.7 11/22/2017       Anesthesia Plan    ASA Status:  1   NPO Status:  NPO Appropriate    Anesthesia Type: General.     - Airway: LMA   Induction: Intravenous.   Maintenance: Balanced.        Consents    Anesthesia Plan(s) and associated risks, benefits, and realistic alternatives discussed. Questions answered and patient/representative(s) expressed understanding.    - Discussed:     - Discussed with:  Patient      - Extended Intubation/Ventilatory Support Discussed: No.      - Patient is DNR/DNI Status: No    Use of blood products discussed: No .     Postoperative Care    Pain management: IV analgesics, Oral pain medications, Multi-modal analgesia.   PONV prophylaxis: Ondansetron (or other 5HT-3), Background Propofol Infusion     Comments:                Yoshi Mathew MD

## 2022-09-29 NOTE — ANESTHESIA CARE TRANSFER NOTE
Patient: Toe Andrzej    Procedure: Procedure(s):  PENIS mass excision       Diagnosis: Foreign body granuloma of penis [M60.28]  Diagnosis Additional Information: No value filed.    Anesthesia Type:   General     Note:    Oropharynx: oropharynx clear of all foreign objects  Level of Consciousness: drowsy  Oxygen Supplementation: nasal cannula    Independent Airway: airway patency satisfactory and stable  Dentition: dentition unchanged  Vital Signs Stable: post-procedure vital signs reviewed and stable  Report to RN Given: handoff report given  Patient transferred to: PACU  Comments: Report to JAELYN Pack. Exchanging well, no English, RN states comfortable with patient.  Handoff Report: Identifed the Patient, Identified the Reponsible Provider, Reviewed the pertinent medical history, Discussed the surgical course, Reviewed Intra-OP anesthesia mangement and issues during anesthesia, Set expectations for post-procedure period and Allowed opportunity for questions and acknowledgement of understanding      Vitals:  Vitals Value Taken Time   /77 09/29/22 1541   Temp     Pulse 75 09/29/22 1543   Resp 16 09/29/22 1543   SpO2 100 % 09/29/22 1543   Vitals shown include unvalidated device data.    Electronically Signed By: JOLLY Villarreal CRNA  September 29, 2022  3:44 PM

## 2022-09-29 NOTE — DISCHARGE INSTRUCTIONS
[facility]   Adult Discharge Orders & Instructions     For 24 hours after surgery    Get plenty of rest.  A responsible adult must stay with you for at least 24 hours after you leave the hospital.   Do not drive or use heavy equipment.  If you have weakness or tingling, don't drive or use heavy equipment until this feeling goes away.  Do not drink alcohol.  Avoid strenuous or risky activities.  Ask for help when climbing stairs.   You may feel lightheaded.  IF so, sit for a few minutes before standing.  Have someone help you get up.   If you have nausea (feel sick to your stomach): Drink only clear liquids such as apple juice, ginger ale, broth or 7-Up.  Rest may also help.  Be sure to drink enough fluids.  Move to a regular diet as you feel able.  You may have a slight fever. Call the doctor if your fever is over 100 F (37.7 C) (taken under the tongue) or lasts longer than 24 hours.  You may have a dry mouth, a sore throat, muscle aches or trouble sleeping.  These should go away after 24 hours.  Do not make important or legal decisions.     Call your doctor for any of the followin.  Signs of infection (fever, growing tenderness at the surgery site, a large amount of drainage or bleeding, severe pain, foul-smelling drainage, redness, swelling).    2. It has been over 8 to 10 hours since surgery and you are still not able to urinate (pass water).    3.  Headache for over 24 hours.    4.  Numbness, tingling or weakness the day after surgery (if you had spinal anesthesia).                  5. Signs of Covid-19 infection (temperature over 100 degrees, shortness of breath, cough, loss of taste/smell, generalized body aches, persistent headache,                  chills, sore throat, nausea/vomiting/diarrhea).    To contact a doctor, call To contact Dr Roldan call:  [phone]     Tylenol 975 mg was given at 1:49 PM. You should not take more then 4,000 mg of tylenol/acetaminophen in a 24 hour period.

## 2022-09-29 NOTE — OP NOTE
Preop diagnosis: Superficial penile mass      Postoperative diagnosis: Same    Procedure done: Excision of penile mass greater than 10 cm in length.    Procedure    Patient brought into the operating room and placed supine.  General anesthesia induced.  He was draped and prepped in the usual surgical fashion.  A penile block performed using 1% lidocaine also.  Patient had a superficial penile mass in the skin extending several cm past the penoscrotal junction to the foreskin.  This is due to previous injection of inert substance for penile enlargement purpose.  This was excised both bluntly and sharply.  Noted that Schmitz cath was placed at the beginning of the procedure to identify the urethra.  The mass was easily over 10 cm in length  and measured  about 1 cm in its widest margin.  After the mass excised the skin edges was then reapproximated using 2-0 Vicryl suture.  Patient tolerated well.  No complications identified during procedure.  There was minimal bleeding during the operation.

## 2022-09-29 NOTE — BRIEF OP NOTE
[facility]   Urology Brief Operative Note    Pre-operative diagnosis: Penile mass   Post-operative diagnosis same   Procedure: Procedure(s):  Excision of penile mass   Surgeon: Cornel Roldan MD   Assistants(s): None   Anesthesia: General     Estimated blood loss: 1 ml    Total IV fluids: (See anesthesia record)   Blood transfusion: No transfusion was given during surgery   Total urine output: (See anesthesia record)  Not measured   Drains or packing: None   Specimens: none   Implants:     Findings: See op note   Complications: None   Condition on discharge: Stable   Comments: See dictated operative report for full details

## 2022-10-10 LAB
PATH REPORT.COMMENTS IMP SPEC: NORMAL
PATH REPORT.COMMENTS IMP SPEC: NORMAL
PATH REPORT.FINAL DX SPEC: NORMAL
PATH REPORT.GROSS SPEC: NORMAL
PATH REPORT.MICROSCOPIC SPEC OTHER STN: NORMAL
PATH REPORT.RELEVANT HX SPEC: NORMAL
PHOTO IMAGE: NORMAL

## 2022-10-18 ENCOUNTER — OFFICE VISIT (OUTPATIENT)
Dept: UROLOGY | Facility: CLINIC | Age: 52
End: 2022-10-18
Payer: COMMERCIAL

## 2022-10-18 VITALS — DIASTOLIC BLOOD PRESSURE: 82 MMHG | SYSTOLIC BLOOD PRESSURE: 117 MMHG | HEART RATE: 70 BPM | OXYGEN SATURATION: 97 %

## 2022-10-18 DIAGNOSIS — M60.28: Primary | ICD-10-CM

## 2022-10-18 PROCEDURE — 99212 OFFICE O/P EST SF 10 MIN: CPT | Performed by: UROLOGY

## 2022-10-18 NOTE — PROGRESS NOTES
Chief Complaint   Patient presents with     RECHECK       Toe Andrzej is a 52 year old male who presents today for follow up of   Chief Complaint   Patient presents with     RECHECK    f/u surgical excision of penile body granuloma.  He is without any complaints.    Current Outpatient Medications   Medication Sig Dispense Refill     Acetaminophen (TYLENOL PO)        HYDROcodone-acetaminophen (NORCO) 5-325 MG tablet Take 1-2 tablets by mouth every 4 hours as needed for moderate to severe pain 10 tablet 0     Allergies   Allergen Reactions     Nkda [No Known Drug Allergies]       No past medical history on file.  Past Surgical History:   Procedure Laterality Date     BIOPSY PENIS N/A 9/29/2022    Procedure: PENIS mass excision;  Surgeon: Cornel Roldan MD;  Location: [facility] OR     Family History   Problem Relation Age of Onset     Diabetes No family hx of      Coronary Artery Disease No family hx of      Breast Cancer No family hx of      Colon Cancer No family hx of      Prostate Cancer No family hx of      Other Cancer No family hx of      Social History     Socioeconomic History     Marital status:      Spouse name: None     Number of children: None     Years of education: None     Highest education level: None   Tobacco Use     Smoking status: Former     Types: Dip, chew, snus or snuff     Smokeless tobacco: Current     Types: Chew     Tobacco comments:     chews tobacco    Substance and Sexual Activity     Alcohol use: Yes     Alcohol/week: 0.0 standard drinks     Drug use: No     Sexual activity: Yes       REVIEW OF SYSTEMS  =================  C: NEGATIVE for fever, chills, change in weight  I: NEGATIVE for worrisome rashes, moles or lesions  E/M: NEGATIVE for ear, mouth and throat problems  R: NEGATIVE for significant cough or SHORTNESS OF BREATH  CV:  NEGATIVE for chest pain, palpitations or peripheral edema  GI: NEGATIVE for nausea, abdominal pain, heartburn, or change in bowel habits  NEURO: NEGATIVE  numbness/weakness  : see HPI  PSYCH: NEGATIVE depression/anxiety  LYmph: no new enlarged lymph nodes  Ortho: no new trauma/movements    Physical Exam:  /82 (BP Location: Right arm, Patient Position: Chair, Cuff Size: Adult Regular)   Pulse 70   SpO2 97%    Patient is pleasant, in no acute distress, good general condition.  Lung: no evidence of respiratory distress    Abdomen: Soft, nondistended, non tender. No masses. No rebound or guarding.   Exam: incision looked good.   Small skin opening close to penile glans.  Skin: Warm and dry.  No redness.  Psych: normal mood and affect  Neuro: alert and oriented  Musculaskeletal: moving all extremities    Assessment/Plan:   (M60.28) Foreign body granuloma of penis  (primary encounter diagnosis)  Comment: doing well  Plan: see in one month for recheck.

## 2022-10-31 ENCOUNTER — OFFICE VISIT (OUTPATIENT)
Dept: FAMILY MEDICINE | Facility: CLINIC | Age: 52
End: 2022-10-31
Attending: FAMILY MEDICINE
Payer: COMMERCIAL

## 2022-10-31 VITALS
RESPIRATION RATE: 20 BRPM | SYSTOLIC BLOOD PRESSURE: 105 MMHG | OXYGEN SATURATION: 96 % | TEMPERATURE: 97.6 F | BODY MASS INDEX: 24.01 KG/M2 | DIASTOLIC BLOOD PRESSURE: 69 MMHG | HEART RATE: 66 BPM | HEIGHT: 66 IN | WEIGHT: 149.4 LBS

## 2022-10-31 DIAGNOSIS — M54.9 MUSCULOSKELETAL BACK PAIN: ICD-10-CM

## 2022-10-31 DIAGNOSIS — Z00.00 ROUTINE GENERAL MEDICAL EXAMINATION AT A HEALTH CARE FACILITY: Primary | ICD-10-CM

## 2022-10-31 PROCEDURE — 82274 ASSAY TEST FOR BLOOD FECAL: CPT

## 2022-10-31 PROCEDURE — 99213 OFFICE O/P EST LOW 20 MIN: CPT | Mod: 25

## 2022-10-31 PROCEDURE — 0052A COVID-19,PF,PFIZER (12+ YRS): CPT

## 2022-10-31 PROCEDURE — 91305 COVID-19,PF,PFIZER (12+ YRS): CPT

## 2022-10-31 RX ORDER — ACETAMINOPHEN 325 MG/1
325 TABLET ORAL EVERY 8 HOURS PRN
Qty: 90 TABLET | Refills: 3 | Status: SHIPPED | OUTPATIENT
Start: 2022-10-31

## 2022-10-31 ASSESSMENT — ENCOUNTER SYMPTOMS
FREQUENCY: 0
WEAKNESS: 0
HEMATOCHEZIA: 0
PARESTHESIAS: 0
FEVER: 0
DIARRHEA: 0
SHORTNESS OF BREATH: 0
HEADACHES: 0
PALPITATIONS: 0
HEARTBURN: 0
ARTHRALGIAS: 0
JOINT SWELLING: 0
NERVOUS/ANXIOUS: 0
CONSTIPATION: 0
COUGH: 0
HEMATURIA: 0
MYALGIAS: 1
CHILLS: 0
EYE PAIN: 0
DIZZINESS: 0
NAUSEA: 0
ABDOMINAL PAIN: 0
DYSURIA: 0
SORE THROAT: 0

## 2022-10-31 NOTE — PROGRESS NOTES
Preceptor Attestation:    I discussed the patient with the resident and evaluated the patient in person. I have verified the content of the note, which accurately reflects my assessment of the patient and the plan of care.   Supervising Physician:  Prasad Case MD.

## 2022-10-31 NOTE — NURSING NOTE
Due to patient being non-English speaking/uses sign language, an  was used for this visit. Only for face-to-face interpretation by an external agency, date and length of interpretation can be found on the scanned worksheet.       name: Milo May  Language: [place]an  Agency:  [employer]  Telephone number: [phone]  Type of interpretation:  Face-to-face, spoken

## 2022-10-31 NOTE — PROGRESS NOTES
"  Assessment & Plan     #Routine general medical examination at a health care facility  #Musculoskeletal back pain  Patient presenting for medication review and to discuss MSK pains, appears chronic and general. MSK pain slightly worsened following penile mass excision 1 month prior, moving less. Discussed physical activity to prevent this type of pain. Patient is also due for FIT, previously negative  - acetaminophen (TYLENOL) 325 MG tablet; Take 1 tablet (325 mg) by mouth every 8 hours as needed for mild pain  Dispense: 90 tablet; Refill: 3  - Fecal colorectal cancer screen FIT  - COVID-19 pfizer vaccine 2 of 2 given today    Stacey Herrera MD  [facility]    Ousmane   Toe is a 52 year old accompanied by his , presenting for the following health issues:  other (Bodyaches, muscelache, ) and Other    MSK: Chronic pain especialy low back, no trauma. Slightly worsened following penile mass excision 1 month prior, moving less. Knows he should be more physically active to prevent this. Tylenol helps.     Review of Systems   As above      Objective    /69   Pulse 66   Temp 97.6  F (36.4  C) (Oral)   Resp 20   Ht 1.676 m (5' 6\")   Wt 67.8 kg (149 lb 6.4 oz)   SpO2 96%   BMI 24.11 kg/m    Body mass index is 24.11 kg/m .  Physical Exam  Constitutional:       General: No acute distress.     Appearance: Normal appearance. Patient not ill-appearing or diaphoretic.   Eyes:      Extraocular Movements: Extraocular movements intact.      Conjunctiva/sclera: Conjunctivae normal.   Pulmonary:      Effort: Pulmonary effort is normal. No respiratory distress.   Neurological:      General: No focal deficit present.      Mental Status: Patient is alert and oriented to person, place, and time.      Comments: No gross deficits appreciated   Psychiatric:         Attention and Perception: Attention normal.         Mood and Affect: Mood normal.         Speech: Speech normal.         Behavior: " Behavior normal.

## 2022-10-31 NOTE — LETTER
November 7, 2022      Toe Andrzej  [address] [place] [address]        Dear ,    We are writing to inform you of your test results.    At your most recent visit you did a screen for colon cancer which came back negative which means there were no signs of colon cancer on this test, this is a normal and healthy result.  This is an annual screening so you will be due for the next one in 1 year.  Thank you for taking care of your health!       Resulted Orders   Fecal colorectal cancer screen FIT   Result Value Ref Range    Occult Blood Screen FIT Negative Negative       If you have any questions or concerns, please call the clinic at the number listed above.       Sincerely,      Stacey Herrera MD           Enbrel Counseling:  I discussed with the patient the risks of etanercept including but not limited to myelosuppression, immunosuppression, autoimmune hepatitis, demyelinating diseases, lymphoma, and infections.  The patient understands that monitoring is required including a PPD at baseline and must alert us or the primary physician if symptoms of infection or other concerning signs are noted.

## 2022-11-02 LAB — HEMOCCULT STL QL IA: NEGATIVE

## 2023-04-18 NOTE — LETTER
May 22, 2019      Toe Andrzej  [address]   [facility] [address]        Dear Allen,  The results of your testing have come back. You are negative for any sexually transmitted diseases including chlamydia, gonorrhea, hepatitis C, HIV, and syphilis. Your cholesterol level is also normal at this time. You should still follow-up with urology as we had previously discussed. If you have any questions, feel free to call back or schedule a follow-up appointment.   Please see below for your test results.    Resulted Orders   HIV Ag/Ab Screen [facility] ([place]un[place])   Result Value Ref Range    HIV Antigen/Antibody Negative Negative    Narrative    Test performed by:  [facility] [address] [place] [address]  Method is Abbott HIV Ag/Ab for the detection of HIV p24 antigen, HIV-1   antibodies and HIV-2 antibodies.   Lipid [facility] ([place]) - Results > 1 hr   Result Value Ref Range    Cholesterol 162 <=199 mg/dL    Triglycerides 246 (H) <=149 mg/dL    HDL Cholesterol 42 >=40 mg/dL    LDL Cholesterol Calculated 71 <=129 mg/dL    Fasting? Unknown     Narrative    Test performed by:  [facility] [address] [place] [address]   Chlamydia/Gono Amplified ([place])   Result Value Ref Range    Chlamydia trac,Amplified Prb Negative Negative    N gonorrhoeae,Amplified Prb Negative Negative    Narrative    Test performed by:  [facility] [address] [place] [address]   Syphilis Screen [facility] ([place]unno)   Result Value Ref Range    Treponema Antibody (Syphilis) Negative Negative    Narrative    Test performed by:  [facility] [address] [place] [address]   Hepatitis C Antibody ([place]unno)   Result Value Ref Range    Hepatitis C Antibody Screen Negative Negative    Narrative    Test performed by:  [facility] [address] [place] [address]       If you have any questions, please call the clinic to make an  appointment.    Sincerely,    Cody Rodrigez MD   Normal

## (undated) DEVICE — SU VICRYL 2-0 SH 27" UND J417H

## (undated) DEVICE — GLOVE PROTEXIS W/NEU-THERA 7.0  2D73TE70

## (undated) DEVICE — NDL 19GA 1.5"

## (undated) DEVICE — SU CHROMIC 3-0 SH 27" G122H

## (undated) DEVICE — SU VICRYL 2-0 SH 27" J317H

## (undated) DEVICE — ESU GROUND PAD ADULT W/CORD E7507

## (undated) DEVICE — DECANTER TRANSFER DEVICE 2008S

## (undated) DEVICE — BNDG COBAN 2"X5YDS UNSTERILE 2082

## (undated) DEVICE — PACK MINOR SBA15MIFSE

## (undated) DEVICE — PREP SKIN SCRUB TRAY 4461A

## (undated) DEVICE — SOL WATER IRRIG 1000ML BOTTLE 07139-09

## (undated) DEVICE — DRAPE LAP PEDS DISP 29492

## (undated) RX ORDER — CEFAZOLIN SODIUM 1 G/3ML
INJECTION, POWDER, FOR SOLUTION INTRAMUSCULAR; INTRAVENOUS
Status: DISPENSED
Start: 2022-09-29

## (undated) RX ORDER — OXYCODONE HYDROCHLORIDE 5 MG/1
TABLET ORAL
Status: DISPENSED
Start: 2022-09-29

## (undated) RX ORDER — PROPOFOL 10 MG/ML
INJECTION, EMULSION INTRAVENOUS
Status: DISPENSED
Start: 2022-09-29

## (undated) RX ORDER — ACETAMINOPHEN 325 MG/1
TABLET ORAL
Status: DISPENSED
Start: 2022-09-29

## (undated) RX ORDER — FENTANYL CITRATE 50 UG/ML
INJECTION, SOLUTION INTRAMUSCULAR; INTRAVENOUS
Status: DISPENSED
Start: 2022-09-29

## (undated) RX ORDER — DEXAMETHASONE SODIUM PHOSPHATE 4 MG/ML
INJECTION, SOLUTION INTRA-ARTICULAR; INTRALESIONAL; INTRAMUSCULAR; INTRAVENOUS; SOFT TISSUE
Status: DISPENSED
Start: 2022-09-29

## (undated) RX ORDER — ONDANSETRON 2 MG/ML
INJECTION INTRAMUSCULAR; INTRAVENOUS
Status: DISPENSED
Start: 2022-09-29

## (undated) RX ORDER — FENTANYL CITRATE-0.9 % NACL/PF 10 MCG/ML
PLASTIC BAG, INJECTION (ML) INTRAVENOUS
Status: DISPENSED
Start: 2022-09-29

## (undated) RX ORDER — LIDOCAINE HYDROCHLORIDE 20 MG/ML
INJECTION, SOLUTION EPIDURAL; INFILTRATION; INTRACAUDAL; PERINEURAL
Status: DISPENSED
Start: 2022-09-29